# Patient Record
Sex: FEMALE | Race: WHITE | NOT HISPANIC OR LATINO | Employment: OTHER | URBAN - METROPOLITAN AREA
[De-identification: names, ages, dates, MRNs, and addresses within clinical notes are randomized per-mention and may not be internally consistent; named-entity substitution may affect disease eponyms.]

---

## 2017-01-04 ENCOUNTER — GENERIC CONVERSION - ENCOUNTER (OUTPATIENT)
Dept: OTHER | Facility: OTHER | Age: 82
End: 2017-01-04

## 2017-01-10 ENCOUNTER — APPOINTMENT (INPATIENT)
Dept: RADIOLOGY | Facility: HOSPITAL | Age: 82
DRG: 871 | End: 2017-01-10
Payer: MEDICARE

## 2017-01-10 ENCOUNTER — GENERIC CONVERSION - ENCOUNTER (OUTPATIENT)
Dept: OTHER | Facility: OTHER | Age: 82
End: 2017-01-10

## 2017-01-10 ENCOUNTER — APPOINTMENT (INPATIENT)
Dept: NON INVASIVE DIAGNOSTICS | Facility: HOSPITAL | Age: 82
DRG: 871 | End: 2017-01-10
Payer: MEDICARE

## 2017-01-10 ENCOUNTER — HOSPITAL ENCOUNTER (INPATIENT)
Facility: HOSPITAL | Age: 82
LOS: 5 days | Discharge: HOME HEALTH | DRG: 871 | End: 2017-01-15
Attending: EMERGENCY MEDICINE | Admitting: HOSPITALIST
Payer: MEDICARE

## 2017-01-10 ENCOUNTER — APPOINTMENT (EMERGENCY)
Dept: RADIOLOGY | Facility: HOSPITAL | Age: 82
DRG: 871 | End: 2017-01-10
Payer: MEDICARE

## 2017-01-10 DIAGNOSIS — I48.91 ATRIAL FIBRILLATION (HCC): ICD-10-CM

## 2017-01-10 DIAGNOSIS — J18.9 PNEUMONIA OF RIGHT LOWER LOBE DUE TO INFECTIOUS ORGANISM: Primary | ICD-10-CM

## 2017-01-10 PROBLEM — R06.02 SOB (SHORTNESS OF BREATH): Status: ACTIVE | Noted: 2017-01-10

## 2017-01-10 PROBLEM — J96.90 RESPIRATORY FAILURE (HCC): Status: ACTIVE | Noted: 2017-01-10

## 2017-01-10 PROBLEM — R65.20 SEVERE SEPSIS (HCC): Status: ACTIVE | Noted: 2017-01-10

## 2017-01-10 PROBLEM — A41.9 SEVERE SEPSIS (HCC): Status: ACTIVE | Noted: 2017-01-10

## 2017-01-10 LAB
ALBUMIN SERPL BCP-MCNC: 3.1 G/DL (ref 3.5–5)
ALP SERPL-CCNC: 122 U/L (ref 46–116)
ALT SERPL W P-5'-P-CCNC: 14 U/L (ref 12–78)
ANION GAP SERPL CALCULATED.3IONS-SCNC: 9 MMOL/L (ref 4–13)
ARTERIAL PATENCY WRIST A: ABNORMAL
AST SERPL W P-5'-P-CCNC: 13 U/L (ref 5–45)
ATRIAL RATE: 115 BPM
BASE EXCESS BLDA CALC-SCNC: 1 MMOL/L (ref -2–3)
BASOPHILS # BLD AUTO: 0.01 THOUSANDS/ΜL (ref 0–0.1)
BASOPHILS NFR BLD AUTO: 0 % (ref 0–1)
BILIRUB SERPL-MCNC: 0.8 MG/DL (ref 0.2–1)
BILIRUB UR QL STRIP: NEGATIVE
BUN SERPL-MCNC: 20 MG/DL (ref 5–25)
CALCIUM SERPL-MCNC: 9.4 MG/DL (ref 8.3–10.1)
CHLORIDE SERPL-SCNC: 97 MMOL/L (ref 100–108)
CLARITY UR: CLEAR
CO2 SERPL-SCNC: 30 MMOL/L (ref 21–32)
COLOR UR: YELLOW
CREAT SERPL-MCNC: 0.94 MG/DL (ref 0.6–1.3)
DS:DELIVERY SYSTEM: ABNORMAL
EOSINOPHIL # BLD AUTO: 0.01 THOUSAND/ΜL (ref 0–0.61)
EOSINOPHIL NFR BLD AUTO: 0 % (ref 0–6)
ERYTHROCYTE [DISTWIDTH] IN BLOOD BY AUTOMATED COUNT: 13.3 % (ref 11.6–15.1)
FIO2 GAS DIL.REBREATH: 32 L
GFR SERPL CREATININE-BSD FRML MDRD: 56.9 ML/MIN/1.73SQ M
GLUCOSE SERPL-MCNC: 86 MG/DL (ref 65–140)
GLUCOSE UR STRIP-MCNC: NEGATIVE MG/DL
HCO3 BLDA-SCNC: 23.3 MMOL/L (ref 22–28)
HCT VFR BLD AUTO: 45.6 % (ref 34.8–46.1)
HGB BLD-MCNC: 14.7 G/DL (ref 11.5–15.4)
HGB UR QL STRIP.AUTO: NEGATIVE
KETONES UR STRIP-MCNC: NEGATIVE MG/DL
L PNEUMO1 AG UR QL IA.RAPID: NEGATIVE
LACTATE SERPL-SCNC: 1.8 MMOL/L (ref 0.5–2)
LACTATE SERPL-SCNC: 2.6 MMOL/L (ref 0.5–2)
LACTATE SERPL-SCNC: 2.6 MMOL/L (ref 0.5–2)
LEUKOCYTE ESTERASE UR QL STRIP: NEGATIVE
LYMPHOCYTES # BLD AUTO: 1.66 THOUSANDS/ΜL (ref 0.6–4.47)
LYMPHOCYTES NFR BLD AUTO: 9 % (ref 14–44)
MCH RBC QN AUTO: 28.8 PG (ref 26.8–34.3)
MCHC RBC AUTO-ENTMCNC: 32.2 G/DL (ref 31.4–37.4)
MCV RBC AUTO: 89 FL (ref 82–98)
MONOCYTES # BLD AUTO: 1.15 THOUSAND/ΜL (ref 0.17–1.22)
MONOCYTES NFR BLD AUTO: 7 % (ref 4–12)
NEUTROPHILS # BLD AUTO: 14.91 THOUSANDS/ΜL (ref 1.85–7.62)
NEUTS SEG NFR BLD AUTO: 84 % (ref 43–75)
NITRITE UR QL STRIP: NEGATIVE
NT-PROBNP SERPL-MCNC: 3361 PG/ML
PCO2 BLD: 24 MMOL/L (ref 21–32)
PCO2 BLD: 30.2 MM HG (ref 36–44)
PH BLD: 7.5 [PH] (ref 7.35–7.45)
PH UR STRIP.AUTO: 6 [PH] (ref 4.5–8)
PLATELET # BLD AUTO: 381 THOUSANDS/UL (ref 149–390)
PMV BLD AUTO: 10 FL (ref 8.9–12.7)
PO2 BLD: 48 MM HG (ref 75–129)
POTASSIUM SERPL-SCNC: 3 MMOL/L (ref 3.5–5.3)
PROT SERPL-MCNC: 8.3 G/DL (ref 6.4–8.2)
PROT UR STRIP-MCNC: NEGATIVE MG/DL
QRS AXIS: -62 DEGREES
QRSD INTERVAL: 82 MS
QT INTERVAL: 322 MS
QTC INTERVAL: 447 MS
RBC # BLD AUTO: 5.11 MILLION/UL (ref 3.81–5.12)
S PNEUM AG UR QL: NEGATIVE
SAMPLE SITE: ABNORMAL
SAO2 % BLD FROM PO2: 87 % (ref 95–98)
SODIUM SERPL-SCNC: 136 MMOL/L (ref 136–145)
SP GR UR STRIP.AUTO: 1.01 (ref 1–1.03)
SPECIMEN SOURCE: ABNORMAL
T WAVE AXIS: 91 DEGREES
T4 FREE SERPL-MCNC: 2.31 NG/DL (ref 0.76–1.46)
TROPONIN I SERPL-MCNC: 0.02 NG/ML
TROPONIN I SERPL-MCNC: <0.02 NG/ML
TSH SERPL DL<=0.05 MIU/L-ACNC: 0.04 UIU/ML (ref 0.36–3.74)
UROBILINOGEN UR QL STRIP.AUTO: 0.2 E.U./DL
VENTRICULAR RATE: 116 BPM
WBC # BLD AUTO: 17.74 THOUSAND/UL (ref 4.31–10.16)

## 2017-01-10 PROCEDURE — 93306 TTE W/DOPPLER COMPLETE: CPT

## 2017-01-10 PROCEDURE — 94640 AIRWAY INHALATION TREATMENT: CPT

## 2017-01-10 PROCEDURE — 87070 CULTURE OTHR SPECIMN AEROBIC: CPT | Performed by: HOSPITALIST

## 2017-01-10 PROCEDURE — 80053 COMPREHEN METABOLIC PANEL: CPT | Performed by: EMERGENCY MEDICINE

## 2017-01-10 PROCEDURE — 81003 URINALYSIS AUTO W/O SCOPE: CPT | Performed by: HOSPITALIST

## 2017-01-10 PROCEDURE — 96374 THER/PROPH/DIAG INJ IV PUSH: CPT

## 2017-01-10 PROCEDURE — 36600 WITHDRAWAL OF ARTERIAL BLOOD: CPT

## 2017-01-10 PROCEDURE — 94760 N-INVAS EAR/PLS OXIMETRY 1: CPT

## 2017-01-10 PROCEDURE — 84443 ASSAY THYROID STIM HORMONE: CPT | Performed by: HOSPITALIST

## 2017-01-10 PROCEDURE — 71010 HB CHEST X-RAY 1 VIEW FRONTAL (PORTABLE): CPT

## 2017-01-10 PROCEDURE — 83880 ASSAY OF NATRIURETIC PEPTIDE: CPT | Performed by: HOSPITALIST

## 2017-01-10 PROCEDURE — 87040 BLOOD CULTURE FOR BACTERIA: CPT | Performed by: EMERGENCY MEDICINE

## 2017-01-10 PROCEDURE — 87798 DETECT AGENT NOS DNA AMP: CPT | Performed by: HOSPITALIST

## 2017-01-10 PROCEDURE — 71020 HB CHEST X-RAY 2VW FRONTAL&LATL: CPT

## 2017-01-10 PROCEDURE — 93005 ELECTROCARDIOGRAM TRACING: CPT | Performed by: EMERGENCY MEDICINE

## 2017-01-10 PROCEDURE — 82803 BLOOD GASES ANY COMBINATION: CPT

## 2017-01-10 PROCEDURE — 36415 COLL VENOUS BLD VENIPUNCTURE: CPT | Performed by: EMERGENCY MEDICINE

## 2017-01-10 PROCEDURE — 87449 NOS EACH ORGANISM AG IA: CPT | Performed by: HOSPITALIST

## 2017-01-10 PROCEDURE — 84439 ASSAY OF FREE THYROXINE: CPT | Performed by: HOSPITALIST

## 2017-01-10 PROCEDURE — 85025 COMPLETE CBC W/AUTO DIFF WBC: CPT | Performed by: EMERGENCY MEDICINE

## 2017-01-10 PROCEDURE — 99285 EMERGENCY DEPT VISIT HI MDM: CPT

## 2017-01-10 PROCEDURE — 84484 ASSAY OF TROPONIN QUANT: CPT | Performed by: HOSPITALIST

## 2017-01-10 PROCEDURE — 87205 SMEAR GRAM STAIN: CPT | Performed by: HOSPITALIST

## 2017-01-10 PROCEDURE — 83605 ASSAY OF LACTIC ACID: CPT | Performed by: EMERGENCY MEDICINE

## 2017-01-10 PROCEDURE — 83605 ASSAY OF LACTIC ACID: CPT | Performed by: HOSPITALIST

## 2017-01-10 RX ORDER — METOPROLOL SUCCINATE 25 MG/1
25 TABLET, EXTENDED RELEASE ORAL DAILY
Status: DISCONTINUED | OUTPATIENT
Start: 2017-01-10 | End: 2017-01-12

## 2017-01-10 RX ORDER — SODIUM CHLORIDE 9 MG/ML
125 INJECTION, SOLUTION INTRAVENOUS CONTINUOUS
Status: DISCONTINUED | OUTPATIENT
Start: 2017-01-10 | End: 2017-01-10

## 2017-01-10 RX ORDER — FENTANYL 50 UG/H
1 PATCH TRANSDERMAL
COMMUNITY

## 2017-01-10 RX ORDER — TRAMADOL HYDROCHLORIDE 50 MG/1
50 TABLET ORAL ONCE
Status: COMPLETED | OUTPATIENT
Start: 2017-01-10 | End: 2017-01-10

## 2017-01-10 RX ORDER — TOLTERODINE TARTRATE 2 MG/1
2 TABLET, EXTENDED RELEASE ORAL 2 TIMES DAILY
Status: DISCONTINUED | OUTPATIENT
Start: 2017-01-10 | End: 2017-01-10

## 2017-01-10 RX ORDER — FLUTICASONE PROPIONATE 50 MCG
2 SPRAY, SUSPENSION (ML) NASAL DAILY
Status: DISCONTINUED | OUTPATIENT
Start: 2017-01-10 | End: 2017-01-15 | Stop reason: HOSPADM

## 2017-01-10 RX ORDER — LEVALBUTEROL INHALATION SOLUTION 0.63 MG/3ML
0.63 SOLUTION RESPIRATORY (INHALATION)
Status: DISCONTINUED | OUTPATIENT
Start: 2017-01-10 | End: 2017-01-15 | Stop reason: HOSPADM

## 2017-01-10 RX ORDER — ALBUTEROL SULFATE 2.5 MG/3ML
2.5 SOLUTION RESPIRATORY (INHALATION) ONCE
Status: COMPLETED | OUTPATIENT
Start: 2017-01-10 | End: 2017-01-10

## 2017-01-10 RX ORDER — POTASSIUM CHLORIDE 20 MEQ/1
20 TABLET, EXTENDED RELEASE ORAL ONCE
Status: COMPLETED | OUTPATIENT
Start: 2017-01-10 | End: 2017-01-10

## 2017-01-10 RX ORDER — MONTELUKAST SODIUM 10 MG/1
10 TABLET ORAL
Status: DISCONTINUED | OUTPATIENT
Start: 2017-01-10 | End: 2017-01-15 | Stop reason: HOSPADM

## 2017-01-10 RX ORDER — FLUTICASONE PROPIONATE 50 MCG
2 SPRAY, SUSPENSION (ML) NASAL DAILY
COMMUNITY

## 2017-01-10 RX ORDER — LEVOFLOXACIN 5 MG/ML
750 INJECTION, SOLUTION INTRAVENOUS EVERY 24 HOURS
Status: DISCONTINUED | OUTPATIENT
Start: 2017-01-10 | End: 2017-01-10

## 2017-01-10 RX ORDER — LISINOPRIL 40 MG/1
40 TABLET ORAL DAILY
COMMUNITY
End: 2017-01-15 | Stop reason: HOSPADM

## 2017-01-10 RX ORDER — TOLTERODINE TARTRATE 2 MG/1
2 TABLET, EXTENDED RELEASE ORAL 2 TIMES DAILY
COMMUNITY

## 2017-01-10 RX ORDER — METOPROLOL SUCCINATE 25 MG/1
25 TABLET, EXTENDED RELEASE ORAL DAILY
COMMUNITY
End: 2017-01-15 | Stop reason: HOSPADM

## 2017-01-10 RX ORDER — LANOLIN ALCOHOL/MO/W.PET/CERES
3 CREAM (GRAM) TOPICAL
Status: DISCONTINUED | OUTPATIENT
Start: 2017-01-10 | End: 2017-01-15 | Stop reason: HOSPADM

## 2017-01-10 RX ORDER — FENTANYL 50 UG/H
50 PATCH TRANSDERMAL
Status: DISCONTINUED | OUTPATIENT
Start: 2017-01-11 | End: 2017-01-10

## 2017-01-10 RX ORDER — POTASSIUM CHLORIDE 20 MEQ/1
40 TABLET, EXTENDED RELEASE ORAL ONCE
Status: COMPLETED | OUTPATIENT
Start: 2017-01-10 | End: 2017-01-10

## 2017-01-10 RX ORDER — AMLODIPINE BESYLATE 10 MG/1
10 TABLET ORAL DAILY
COMMUNITY
End: 2017-01-15 | Stop reason: HOSPADM

## 2017-01-10 RX ORDER — FUROSEMIDE 10 MG/ML
20 INJECTION INTRAMUSCULAR; INTRAVENOUS ONCE
Status: COMPLETED | OUTPATIENT
Start: 2017-01-10 | End: 2017-01-10

## 2017-01-10 RX ORDER — GUAIFENESIN/DEXTROMETHORPHAN 100-10MG/5
10 SYRUP ORAL EVERY 4 HOURS PRN
Status: DISCONTINUED | OUTPATIENT
Start: 2017-01-10 | End: 2017-01-15 | Stop reason: HOSPADM

## 2017-01-10 RX ORDER — ACETAMINOPHEN 325 MG/1
650 TABLET ORAL EVERY 6 HOURS PRN
Status: DISCONTINUED | OUTPATIENT
Start: 2017-01-10 | End: 2017-01-15 | Stop reason: HOSPADM

## 2017-01-10 RX ORDER — TOLTERODINE TARTRATE 1 MG/1
2 TABLET, EXTENDED RELEASE ORAL 2 TIMES DAILY
Status: DISCONTINUED | OUTPATIENT
Start: 2017-01-10 | End: 2017-01-15 | Stop reason: HOSPADM

## 2017-01-10 RX ORDER — DILTIAZEM HYDROCHLORIDE 5 MG/ML
5 INJECTION INTRAVENOUS ONCE
Status: COMPLETED | OUTPATIENT
Start: 2017-01-10 | End: 2017-01-10

## 2017-01-10 RX ORDER — MONTELUKAST SODIUM 10 MG/1
10 TABLET ORAL
COMMUNITY

## 2017-01-10 RX ORDER — METOPROLOL SUCCINATE 25 MG/1
25 TABLET, EXTENDED RELEASE ORAL DAILY
Status: DISCONTINUED | OUTPATIENT
Start: 2017-01-10 | End: 2017-01-10

## 2017-01-10 RX ORDER — HEPARIN SODIUM 5000 [USP'U]/ML
5000 INJECTION, SOLUTION INTRAVENOUS; SUBCUTANEOUS EVERY 8 HOURS SCHEDULED
Status: DISCONTINUED | OUTPATIENT
Start: 2017-01-10 | End: 2017-01-10

## 2017-01-10 RX ORDER — FENTANYL 50 UG/H
50 PATCH TRANSDERMAL
Status: DISCONTINUED | OUTPATIENT
Start: 2017-01-11 | End: 2017-01-11

## 2017-01-10 RX ORDER — HYDROCHLOROTHIAZIDE 25 MG/1
25 TABLET ORAL DAILY
COMMUNITY
End: 2017-01-15 | Stop reason: HOSPADM

## 2017-01-10 RX ORDER — HYDROCODONE BITARTRATE AND ACETAMINOPHEN 5; 325 MG/1; MG/1
1 TABLET ORAL ONCE
Status: DISCONTINUED | OUTPATIENT
Start: 2017-01-10 | End: 2017-01-10

## 2017-01-10 RX ORDER — LEVOFLOXACIN 5 MG/ML
750 INJECTION, SOLUTION INTRAVENOUS ONCE
Status: COMPLETED | OUTPATIENT
Start: 2017-01-10 | End: 2017-01-10

## 2017-01-10 RX ADMIN — METOPROLOL TARTRATE 12.5 MG: 25 TABLET ORAL at 13:35

## 2017-01-10 RX ADMIN — METOPROLOL SUCCINATE 25 MG: 25 TABLET, EXTENDED RELEASE ORAL at 17:29

## 2017-01-10 RX ADMIN — DILTIAZEM HYDROCHLORIDE 5 MG: 5 INJECTION INTRAVENOUS at 09:35

## 2017-01-10 RX ADMIN — APIXABAN 2.5 MG: 2.5 TABLET, FILM COATED ORAL at 17:29

## 2017-01-10 RX ADMIN — MELATONIN TAB 3 MG 3 MG: 3 TAB at 23:45

## 2017-01-10 RX ADMIN — IPRATROPIUM BROMIDE 0.5 MG: 0.5 SOLUTION RESPIRATORY (INHALATION) at 08:22

## 2017-01-10 RX ADMIN — SODIUM CHLORIDE 125 ML/HR: 0.9 INJECTION, SOLUTION INTRAVENOUS at 08:59

## 2017-01-10 RX ADMIN — TRAMADOL HYDROCHLORIDE 50 MG: 50 TABLET, COATED ORAL at 21:51

## 2017-01-10 RX ADMIN — DILTIAZEM HYDROCHLORIDE 5 MG/HR: 5 INJECTION INTRAVENOUS at 09:59

## 2017-01-10 RX ADMIN — FLUTICASONE PROPIONATE 2 SPRAY: 50 SPRAY, METERED NASAL at 13:36

## 2017-01-10 RX ADMIN — POTASSIUM CHLORIDE 20 MEQ: 1500 TABLET, EXTENDED RELEASE ORAL at 09:30

## 2017-01-10 RX ADMIN — ALBUTEROL SULFATE 2.5 MG: 2.5 SOLUTION RESPIRATORY (INHALATION) at 08:22

## 2017-01-10 RX ADMIN — AZITHROMYCIN MONOHYDRATE 500 MG: 500 INJECTION, POWDER, LYOPHILIZED, FOR SOLUTION INTRAVENOUS at 13:35

## 2017-01-10 RX ADMIN — SODIUM CHLORIDE 1251 ML: 0.9 INJECTION, SOLUTION INTRAVENOUS at 12:50

## 2017-01-10 RX ADMIN — IPRATROPIUM BROMIDE 0.5 MG: 0.5 SOLUTION RESPIRATORY (INHALATION) at 13:56

## 2017-01-10 RX ADMIN — LEVALBUTEROL HYDROCHLORIDE 0.63 MG: 0.63 SOLUTION RESPIRATORY (INHALATION) at 13:55

## 2017-01-10 RX ADMIN — MONTELUKAST SODIUM 10 MG: 10 TABLET, FILM COATED ORAL at 21:51

## 2017-01-10 RX ADMIN — ACETAMINOPHEN 650 MG: 325 TABLET, FILM COATED ORAL at 17:40

## 2017-01-10 RX ADMIN — HEPARIN SODIUM 5000 UNITS: 5000 INJECTION, SOLUTION INTRAVENOUS; SUBCUTANEOUS at 13:34

## 2017-01-10 RX ADMIN — LEVOFLOXACIN 750 MG: 5 INJECTION, SOLUTION INTRAVENOUS at 09:40

## 2017-01-10 RX ADMIN — SODIUM CHLORIDE 125 ML/HR: 0.9 INJECTION, SOLUTION INTRAVENOUS at 13:51

## 2017-01-10 RX ADMIN — TOLTERODINE TARTRATE 2 MG: 1 TABLET, FILM COATED ORAL at 13:36

## 2017-01-10 RX ADMIN — DILTIAZEM HYDROCHLORIDE 5 MG: 5 INJECTION INTRAVENOUS at 08:59

## 2017-01-10 RX ADMIN — CEFTRIAXONE SODIUM 1000 MG: 1 INJECTION, POWDER, FOR SOLUTION INTRAMUSCULAR; INTRAVENOUS at 15:53

## 2017-01-10 RX ADMIN — POTASSIUM CHLORIDE 40 MEQ: 1500 TABLET, EXTENDED RELEASE ORAL at 13:34

## 2017-01-10 RX ADMIN — FUROSEMIDE 20 MG: 10 INJECTION, SOLUTION INTRAMUSCULAR; INTRAVENOUS at 19:56

## 2017-01-11 LAB
ANION GAP SERPL CALCULATED.3IONS-SCNC: 8 MMOL/L (ref 4–13)
BUN SERPL-MCNC: 11 MG/DL (ref 5–25)
CALCIUM SERPL-MCNC: 7.7 MG/DL (ref 8.3–10.1)
CHLORIDE SERPL-SCNC: 101 MMOL/L (ref 100–108)
CO2 SERPL-SCNC: 27 MMOL/L (ref 21–32)
CREAT SERPL-MCNC: 0.76 MG/DL (ref 0.6–1.3)
ERYTHROCYTE [DISTWIDTH] IN BLOOD BY AUTOMATED COUNT: 13.2 % (ref 11.6–15.1)
FLUAV AG SPEC QL: DETECTED
FLUBV AG SPEC QL: ABNORMAL
GFR SERPL CREATININE-BSD FRML MDRD: >60 ML/MIN/1.73SQ M
GLUCOSE SERPL-MCNC: 105 MG/DL (ref 65–140)
GLUCOSE SERPL-MCNC: 92 MG/DL (ref 65–140)
HCT VFR BLD AUTO: 38.8 % (ref 34.8–46.1)
HGB BLD-MCNC: 12.7 G/DL (ref 11.5–15.4)
INR PPP: 1.31 (ref 0.86–1.16)
MAGNESIUM SERPL-MCNC: 1.5 MG/DL (ref 1.6–2.6)
MCH RBC QN AUTO: 29.6 PG (ref 26.8–34.3)
MCHC RBC AUTO-ENTMCNC: 32.7 G/DL (ref 31.4–37.4)
MCV RBC AUTO: 90 FL (ref 82–98)
PHOSPHATE SERPL-MCNC: 1.6 MG/DL (ref 2.3–4.1)
PLATELET # BLD AUTO: 264 THOUSANDS/UL (ref 149–390)
PMV BLD AUTO: 10 FL (ref 8.9–12.7)
POTASSIUM SERPL-SCNC: 3.4 MMOL/L (ref 3.5–5.3)
PROTHROMBIN TIME: 16.1 SECONDS (ref 12–14.3)
RBC # BLD AUTO: 4.29 MILLION/UL (ref 3.81–5.12)
RSV B RNA SPEC QL NAA+PROBE: ABNORMAL
SODIUM SERPL-SCNC: 136 MMOL/L (ref 136–145)
WBC # BLD AUTO: 14.01 THOUSAND/UL (ref 4.31–10.16)

## 2017-01-11 PROCEDURE — 84100 ASSAY OF PHOSPHORUS: CPT | Performed by: HOSPITALIST

## 2017-01-11 PROCEDURE — 94640 AIRWAY INHALATION TREATMENT: CPT

## 2017-01-11 PROCEDURE — 85610 PROTHROMBIN TIME: CPT | Performed by: HOSPITALIST

## 2017-01-11 PROCEDURE — 82948 REAGENT STRIP/BLOOD GLUCOSE: CPT

## 2017-01-11 PROCEDURE — 85027 COMPLETE CBC AUTOMATED: CPT | Performed by: HOSPITALIST

## 2017-01-11 PROCEDURE — 80048 BASIC METABOLIC PNL TOTAL CA: CPT | Performed by: HOSPITALIST

## 2017-01-11 PROCEDURE — 94760 N-INVAS EAR/PLS OXIMETRY 1: CPT

## 2017-01-11 PROCEDURE — 94664 DEMO&/EVAL PT USE INHALER: CPT

## 2017-01-11 PROCEDURE — 83735 ASSAY OF MAGNESIUM: CPT | Performed by: HOSPITALIST

## 2017-01-11 RX ORDER — FUROSEMIDE 10 MG/ML
20 INJECTION INTRAMUSCULAR; INTRAVENOUS ONCE
Status: COMPLETED | OUTPATIENT
Start: 2017-01-11 | End: 2017-01-11

## 2017-01-11 RX ORDER — POTASSIUM CHLORIDE 20 MEQ/1
40 TABLET, EXTENDED RELEASE ORAL ONCE
Status: COMPLETED | OUTPATIENT
Start: 2017-01-11 | End: 2017-01-11

## 2017-01-11 RX ORDER — OSELTAMIVIR PHOSPHATE 75 MG/1
75 CAPSULE ORAL EVERY 12 HOURS SCHEDULED
Status: DISCONTINUED | OUTPATIENT
Start: 2017-01-11 | End: 2017-01-15 | Stop reason: HOSPADM

## 2017-01-11 RX ORDER — FENTANYL 50 UG/H
50 PATCH TRANSDERMAL
Status: DISCONTINUED | OUTPATIENT
Start: 2017-01-12 | End: 2017-01-15 | Stop reason: HOSPADM

## 2017-01-11 RX ORDER — TRAMADOL HYDROCHLORIDE 50 MG/1
50 TABLET ORAL EVERY 6 HOURS PRN
Status: DISCONTINUED | OUTPATIENT
Start: 2017-01-11 | End: 2017-01-15 | Stop reason: HOSPADM

## 2017-01-11 RX ORDER — DIGOXIN 0.25 MG/ML
250 INJECTION INTRAMUSCULAR; INTRAVENOUS ONCE
Status: COMPLETED | OUTPATIENT
Start: 2017-01-11 | End: 2017-01-11

## 2017-01-11 RX ADMIN — METOPROLOL TARTRATE 5 MG: 5 INJECTION INTRAVENOUS at 09:27

## 2017-01-11 RX ADMIN — DIGOXIN 250 MCG: 0.25 INJECTION INTRAMUSCULAR; INTRAVENOUS at 09:27

## 2017-01-11 RX ADMIN — LEVALBUTEROL HYDROCHLORIDE 0.63 MG: 0.63 SOLUTION RESPIRATORY (INHALATION) at 00:43

## 2017-01-11 RX ADMIN — TOLTERODINE TARTRATE 2 MG: 1 TABLET, FILM COATED ORAL at 18:07

## 2017-01-11 RX ADMIN — POTASSIUM CHLORIDE 40 MEQ: 1500 TABLET, EXTENDED RELEASE ORAL at 12:13

## 2017-01-11 RX ADMIN — MELATONIN TAB 3 MG 3 MG: 3 TAB at 21:38

## 2017-01-11 RX ADMIN — FLUTICASONE PROPIONATE 2 SPRAY: 50 SPRAY, METERED NASAL at 08:44

## 2017-01-11 RX ADMIN — ACETAMINOPHEN 650 MG: 325 TABLET, FILM COATED ORAL at 14:23

## 2017-01-11 RX ADMIN — TRAMADOL HYDROCHLORIDE 50 MG: 50 TABLET, COATED ORAL at 16:32

## 2017-01-11 RX ADMIN — FUROSEMIDE 20 MG: 10 INJECTION, SOLUTION INTRAMUSCULAR; INTRAVENOUS at 13:02

## 2017-01-11 RX ADMIN — TOLTERODINE TARTRATE 2 MG: 1 TABLET, FILM COATED ORAL at 08:44

## 2017-01-11 RX ADMIN — LEVALBUTEROL HYDROCHLORIDE 0.63 MG: 0.63 SOLUTION RESPIRATORY (INHALATION) at 16:13

## 2017-01-11 RX ADMIN — ACETAMINOPHEN 650 MG: 325 TABLET, FILM COATED ORAL at 08:44

## 2017-01-11 RX ADMIN — IPRATROPIUM BROMIDE 0.5 MG: 0.5 SOLUTION RESPIRATORY (INHALATION) at 16:13

## 2017-01-11 RX ADMIN — TRAMADOL HYDROCHLORIDE 50 MG: 50 TABLET, COATED ORAL at 09:27

## 2017-01-11 RX ADMIN — CEFTRIAXONE SODIUM 1000 MG: 1 INJECTION, POWDER, FOR SOLUTION INTRAMUSCULAR; INTRAVENOUS at 14:56

## 2017-01-11 RX ADMIN — OSELTAMIVIR PHOSPHATE 75 MG: 75 CAPSULE ORAL at 21:38

## 2017-01-11 RX ADMIN — MONTELUKAST SODIUM 10 MG: 10 TABLET, FILM COATED ORAL at 21:38

## 2017-01-11 RX ADMIN — AZITHROMYCIN MONOHYDRATE 500 MG: 500 INJECTION, POWDER, LYOPHILIZED, FOR SOLUTION INTRAVENOUS at 13:02

## 2017-01-11 RX ADMIN — METOPROLOL SUCCINATE 25 MG: 25 TABLET, EXTENDED RELEASE ORAL at 08:44

## 2017-01-11 RX ADMIN — APIXABAN 2.5 MG: 2.5 TABLET, FILM COATED ORAL at 08:44

## 2017-01-11 RX ADMIN — APIXABAN 2.5 MG: 2.5 TABLET, FILM COATED ORAL at 18:07

## 2017-01-11 RX ADMIN — LEVALBUTEROL HYDROCHLORIDE 0.63 MG: 0.63 SOLUTION RESPIRATORY (INHALATION) at 08:14

## 2017-01-11 RX ADMIN — OSELTAMIVIR PHOSPHATE 75 MG: 75 CAPSULE ORAL at 12:15

## 2017-01-12 ENCOUNTER — APPOINTMENT (INPATIENT)
Dept: RADIOLOGY | Facility: HOSPITAL | Age: 82
DRG: 871 | End: 2017-01-12
Payer: MEDICARE

## 2017-01-12 LAB
ANION GAP SERPL CALCULATED.3IONS-SCNC: 7 MMOL/L (ref 4–13)
BACTERIA SPT RESP CULT: NORMAL
BACTERIA SPT RESP CULT: NORMAL
BUN SERPL-MCNC: 12 MG/DL (ref 5–25)
CALCIUM SERPL-MCNC: 7.2 MG/DL (ref 8.3–10.1)
CHLORIDE SERPL-SCNC: 100 MMOL/L (ref 100–108)
CO2 SERPL-SCNC: 26 MMOL/L (ref 21–32)
CREAT SERPL-MCNC: 0.71 MG/DL (ref 0.6–1.3)
ERYTHROCYTE [DISTWIDTH] IN BLOOD BY AUTOMATED COUNT: 13.1 % (ref 11.6–15.1)
GFR SERPL CREATININE-BSD FRML MDRD: >60 ML/MIN/1.73SQ M
GLUCOSE SERPL-MCNC: 96 MG/DL (ref 65–140)
GRAM STN SPEC: NORMAL
HCT VFR BLD AUTO: 38.3 % (ref 34.8–46.1)
HGB BLD-MCNC: 12.3 G/DL (ref 11.5–15.4)
MCH RBC QN AUTO: 29.2 PG (ref 26.8–34.3)
MCHC RBC AUTO-ENTMCNC: 32.1 G/DL (ref 31.4–37.4)
MCV RBC AUTO: 91 FL (ref 82–98)
PLATELET # BLD AUTO: 262 THOUSANDS/UL (ref 149–390)
PMV BLD AUTO: 9.7 FL (ref 8.9–12.7)
POTASSIUM SERPL-SCNC: 4 MMOL/L (ref 3.5–5.3)
RBC # BLD AUTO: 4.21 MILLION/UL (ref 3.81–5.12)
SODIUM SERPL-SCNC: 133 MMOL/L (ref 136–145)
WBC # BLD AUTO: 12.61 THOUSAND/UL (ref 4.31–10.16)

## 2017-01-12 PROCEDURE — 94640 AIRWAY INHALATION TREATMENT: CPT

## 2017-01-12 PROCEDURE — 71010 HB CHEST X-RAY 1 VIEW FRONTAL (PORTABLE): CPT

## 2017-01-12 PROCEDURE — 85027 COMPLETE CBC AUTOMATED: CPT | Performed by: HOSPITALIST

## 2017-01-12 PROCEDURE — 94760 N-INVAS EAR/PLS OXIMETRY 1: CPT

## 2017-01-12 PROCEDURE — 80048 BASIC METABOLIC PNL TOTAL CA: CPT | Performed by: HOSPITALIST

## 2017-01-12 RX ORDER — LORAZEPAM 2 MG/ML
0.5 INJECTION INTRAMUSCULAR ONCE
Status: COMPLETED | OUTPATIENT
Start: 2017-01-12 | End: 2017-01-12

## 2017-01-12 RX ORDER — FUROSEMIDE 10 MG/ML
20 INJECTION INTRAMUSCULAR; INTRAVENOUS ONCE
Status: COMPLETED | OUTPATIENT
Start: 2017-01-12 | End: 2017-01-12

## 2017-01-12 RX ORDER — METHYLPREDNISOLONE SODIUM SUCCINATE 40 MG/ML
40 INJECTION, POWDER, LYOPHILIZED, FOR SOLUTION INTRAMUSCULAR; INTRAVENOUS DAILY
Status: DISCONTINUED | OUTPATIENT
Start: 2017-01-12 | End: 2017-01-14

## 2017-01-12 RX ORDER — ONDANSETRON 2 MG/ML
4 INJECTION INTRAMUSCULAR; INTRAVENOUS EVERY 6 HOURS PRN
Status: DISCONTINUED | OUTPATIENT
Start: 2017-01-12 | End: 2017-01-15 | Stop reason: HOSPADM

## 2017-01-12 RX ADMIN — FENTANYL 50 MCG: 50 PATCH, EXTENDED RELEASE TRANSDERMAL at 09:03

## 2017-01-12 RX ADMIN — MELATONIN TAB 3 MG 3 MG: 3 TAB at 21:50

## 2017-01-12 RX ADMIN — METOPROLOL TARTRATE 25 MG: 25 TABLET ORAL at 09:02

## 2017-01-12 RX ADMIN — ONDANSETRON 4 MG: 2 INJECTION INTRAMUSCULAR; INTRAVENOUS at 00:19

## 2017-01-12 RX ADMIN — METOPROLOL TARTRATE 25 MG: 25 TABLET ORAL at 21:50

## 2017-01-12 RX ADMIN — GUAIFENESIN AND DEXTROMETHORPHAN 10 ML: 100; 10 SYRUP ORAL at 10:12

## 2017-01-12 RX ADMIN — LEVALBUTEROL HYDROCHLORIDE 0.63 MG: 0.63 SOLUTION RESPIRATORY (INHALATION) at 15:06

## 2017-01-12 RX ADMIN — METHYLPREDNISOLONE SODIUM SUCCINATE 40 MG: 40 INJECTION, POWDER, FOR SOLUTION INTRAMUSCULAR; INTRAVENOUS at 09:03

## 2017-01-12 RX ADMIN — OSELTAMIVIR PHOSPHATE 75 MG: 75 CAPSULE ORAL at 09:07

## 2017-01-12 RX ADMIN — MONTELUKAST SODIUM 10 MG: 10 TABLET, FILM COATED ORAL at 21:50

## 2017-01-12 RX ADMIN — METOPROLOL TARTRATE 25 MG: 25 TABLET ORAL at 15:30

## 2017-01-12 RX ADMIN — APIXABAN 2.5 MG: 2.5 TABLET, FILM COATED ORAL at 09:03

## 2017-01-12 RX ADMIN — TOLTERODINE TARTRATE 2 MG: 1 TABLET, FILM COATED ORAL at 09:07

## 2017-01-12 RX ADMIN — CEFTRIAXONE SODIUM 1000 MG: 1 INJECTION, POWDER, FOR SOLUTION INTRAMUSCULAR; INTRAVENOUS at 15:27

## 2017-01-12 RX ADMIN — LEVALBUTEROL HYDROCHLORIDE 0.63 MG: 0.63 SOLUTION RESPIRATORY (INHALATION) at 07:33

## 2017-01-12 RX ADMIN — IPRATROPIUM BROMIDE 0.5 MG: 0.5 SOLUTION RESPIRATORY (INHALATION) at 15:06

## 2017-01-12 RX ADMIN — LORAZEPAM 0.5 MG: 2 INJECTION, SOLUTION INTRAMUSCULAR; INTRAVENOUS at 00:49

## 2017-01-12 RX ADMIN — FUROSEMIDE 20 MG: 10 INJECTION, SOLUTION INTRAMUSCULAR; INTRAVENOUS at 10:12

## 2017-01-12 RX ADMIN — OSELTAMIVIR PHOSPHATE 75 MG: 75 CAPSULE ORAL at 21:52

## 2017-01-12 RX ADMIN — IPRATROPIUM BROMIDE 0.5 MG: 0.5 SOLUTION RESPIRATORY (INHALATION) at 07:33

## 2017-01-12 RX ADMIN — TOLTERODINE TARTRATE 2 MG: 1 TABLET, FILM COATED ORAL at 17:40

## 2017-01-12 RX ADMIN — AZITHROMYCIN MONOHYDRATE 500 MG: 500 INJECTION, POWDER, LYOPHILIZED, FOR SOLUTION INTRAVENOUS at 13:02

## 2017-01-12 RX ADMIN — FLUTICASONE PROPIONATE 2 SPRAY: 50 SPRAY, METERED NASAL at 09:07

## 2017-01-12 RX ADMIN — APIXABAN 2.5 MG: 2.5 TABLET, FILM COATED ORAL at 17:39

## 2017-01-13 LAB
ANION GAP SERPL CALCULATED.3IONS-SCNC: 8 MMOL/L (ref 4–13)
BASOPHILS # BLD MANUAL: 0 THOUSAND/UL (ref 0–0.1)
BASOPHILS NFR MAR MANUAL: 0 % (ref 0–1)
BUN SERPL-MCNC: 19 MG/DL (ref 5–25)
CALCIUM SERPL-MCNC: 7.8 MG/DL (ref 8.3–10.1)
CHLORIDE SERPL-SCNC: 96 MMOL/L (ref 100–108)
CO2 SERPL-SCNC: 28 MMOL/L (ref 21–32)
CREAT SERPL-MCNC: 0.61 MG/DL (ref 0.6–1.3)
EOSINOPHIL # BLD MANUAL: 0 THOUSAND/UL (ref 0–0.4)
EOSINOPHIL NFR BLD MANUAL: 0 % (ref 0–6)
ERYTHROCYTE [DISTWIDTH] IN BLOOD BY AUTOMATED COUNT: 12.8 % (ref 11.6–15.1)
GFR SERPL CREATININE-BSD FRML MDRD: >60 ML/MIN/1.73SQ M
GLUCOSE SERPL-MCNC: 117 MG/DL (ref 65–140)
HCT VFR BLD AUTO: 39.5 % (ref 34.8–46.1)
HGB BLD-MCNC: 12.9 G/DL (ref 11.5–15.4)
LYMPHOCYTES # BLD AUTO: 0.64 THOUSAND/UL (ref 0.6–4.47)
LYMPHOCYTES # BLD AUTO: 6 % (ref 14–44)
MCH RBC QN AUTO: 29.4 PG (ref 26.8–34.3)
MCHC RBC AUTO-ENTMCNC: 32.7 G/DL (ref 31.4–37.4)
MCV RBC AUTO: 90 FL (ref 82–98)
MONOCYTES # BLD AUTO: 0.21 THOUSAND/UL (ref 0–1.22)
MONOCYTES NFR BLD: 2 % (ref 4–12)
NEUTROPHILS # BLD MANUAL: 9.74 THOUSAND/UL (ref 1.85–7.62)
NEUTS SEG NFR BLD AUTO: 92 % (ref 43–75)
PLATELET # BLD AUTO: 313 THOUSANDS/UL (ref 149–390)
PLATELET BLD QL SMEAR: ADEQUATE
PMV BLD AUTO: 9.6 FL (ref 8.9–12.7)
POTASSIUM SERPL-SCNC: 4.2 MMOL/L (ref 3.5–5.3)
RBC # BLD AUTO: 4.39 MILLION/UL (ref 3.81–5.12)
SODIUM SERPL-SCNC: 132 MMOL/L (ref 136–145)
TOTAL CELLS COUNTED SPEC: 100
WBC # BLD AUTO: 10.59 THOUSAND/UL (ref 4.31–10.16)

## 2017-01-13 PROCEDURE — 85007 BL SMEAR W/DIFF WBC COUNT: CPT | Performed by: HOSPITALIST

## 2017-01-13 PROCEDURE — 80048 BASIC METABOLIC PNL TOTAL CA: CPT | Performed by: HOSPITALIST

## 2017-01-13 PROCEDURE — 85027 COMPLETE CBC AUTOMATED: CPT | Performed by: HOSPITALIST

## 2017-01-13 PROCEDURE — G8979 MOBILITY GOAL STATUS: HCPCS

## 2017-01-13 PROCEDURE — 97163 PT EVAL HIGH COMPLEX 45 MIN: CPT

## 2017-01-13 PROCEDURE — 94640 AIRWAY INHALATION TREATMENT: CPT

## 2017-01-13 PROCEDURE — G8978 MOBILITY CURRENT STATUS: HCPCS

## 2017-01-13 PROCEDURE — 94760 N-INVAS EAR/PLS OXIMETRY 1: CPT

## 2017-01-13 RX ORDER — CALCIUM CARBONATE 200(500)MG
500 TABLET,CHEWABLE ORAL 3 TIMES DAILY PRN
Status: DISCONTINUED | OUTPATIENT
Start: 2017-01-13 | End: 2017-01-15 | Stop reason: HOSPADM

## 2017-01-13 RX ORDER — DOCUSATE SODIUM 100 MG/1
100 CAPSULE, LIQUID FILLED ORAL 2 TIMES DAILY
Status: DISCONTINUED | OUTPATIENT
Start: 2017-01-13 | End: 2017-01-15 | Stop reason: HOSPADM

## 2017-01-13 RX ORDER — FUROSEMIDE 10 MG/ML
20 INJECTION INTRAMUSCULAR; INTRAVENOUS ONCE
Status: COMPLETED | OUTPATIENT
Start: 2017-01-13 | End: 2017-01-13

## 2017-01-13 RX ORDER — SENNOSIDES 8.6 MG
1 TABLET ORAL
Status: DISCONTINUED | OUTPATIENT
Start: 2017-01-13 | End: 2017-01-15 | Stop reason: HOSPADM

## 2017-01-13 RX ADMIN — CALCIUM CARBONATE (ANTACID) CHEW TAB 500 MG 500 MG: 500 CHEW TAB at 11:12

## 2017-01-13 RX ADMIN — LEVALBUTEROL HYDROCHLORIDE 0.63 MG: 0.63 SOLUTION RESPIRATORY (INHALATION) at 00:30

## 2017-01-13 RX ADMIN — LEVALBUTEROL HYDROCHLORIDE 0.63 MG: 0.63 SOLUTION RESPIRATORY (INHALATION) at 14:50

## 2017-01-13 RX ADMIN — OSELTAMIVIR PHOSPHATE 75 MG: 75 CAPSULE ORAL at 08:21

## 2017-01-13 RX ADMIN — MONTELUKAST SODIUM 10 MG: 10 TABLET, FILM COATED ORAL at 21:55

## 2017-01-13 RX ADMIN — DOCUSATE SODIUM 100 MG: 100 CAPSULE, LIQUID FILLED ORAL at 21:55

## 2017-01-13 RX ADMIN — TOLTERODINE TARTRATE 2 MG: 1 TABLET, FILM COATED ORAL at 08:20

## 2017-01-13 RX ADMIN — FLUTICASONE PROPIONATE 2 SPRAY: 50 SPRAY, METERED NASAL at 08:20

## 2017-01-13 RX ADMIN — METHYLPREDNISOLONE SODIUM SUCCINATE 40 MG: 40 INJECTION, POWDER, FOR SOLUTION INTRAMUSCULAR; INTRAVENOUS at 08:20

## 2017-01-13 RX ADMIN — APIXABAN 2.5 MG: 2.5 TABLET, FILM COATED ORAL at 17:18

## 2017-01-13 RX ADMIN — MELATONIN TAB 3 MG 3 MG: 3 TAB at 21:55

## 2017-01-13 RX ADMIN — FUROSEMIDE 20 MG: 10 INJECTION, SOLUTION INTRAMUSCULAR; INTRAVENOUS at 11:12

## 2017-01-13 RX ADMIN — METOPROLOL TARTRATE 25 MG: 25 TABLET ORAL at 03:48

## 2017-01-13 RX ADMIN — OSELTAMIVIR PHOSPHATE 75 MG: 75 CAPSULE ORAL at 21:55

## 2017-01-13 RX ADMIN — METOPROLOL TARTRATE 25 MG: 25 TABLET ORAL at 08:20

## 2017-01-13 RX ADMIN — IPRATROPIUM BROMIDE 0.5 MG: 0.5 SOLUTION RESPIRATORY (INHALATION) at 15:00

## 2017-01-13 RX ADMIN — CEFTRIAXONE SODIUM 1000 MG: 1 INJECTION, POWDER, FOR SOLUTION INTRAMUSCULAR; INTRAVENOUS at 15:39

## 2017-01-13 RX ADMIN — APIXABAN 2.5 MG: 2.5 TABLET, FILM COATED ORAL at 08:20

## 2017-01-13 RX ADMIN — AZITHROMYCIN MONOHYDRATE 500 MG: 500 INJECTION, POWDER, LYOPHILIZED, FOR SOLUTION INTRAVENOUS at 14:13

## 2017-01-13 RX ADMIN — IPRATROPIUM BROMIDE 0.5 MG: 0.5 SOLUTION RESPIRATORY (INHALATION) at 00:30

## 2017-01-13 RX ADMIN — LEVALBUTEROL HYDROCHLORIDE 0.63 MG: 0.63 SOLUTION RESPIRATORY (INHALATION) at 09:14

## 2017-01-13 RX ADMIN — METOPROLOL TARTRATE 25 MG: 25 TABLET ORAL at 14:13

## 2017-01-13 RX ADMIN — TOLTERODINE TARTRATE 2 MG: 1 TABLET, FILM COATED ORAL at 17:18

## 2017-01-13 RX ADMIN — ONDANSETRON 4 MG: 2 INJECTION INTRAMUSCULAR; INTRAVENOUS at 15:38

## 2017-01-13 RX ADMIN — SENNOSIDES 8.6 MG: 8.6 TABLET, FILM COATED ORAL at 21:55

## 2017-01-13 RX ADMIN — METOPROLOL TARTRATE 25 MG: 25 TABLET ORAL at 21:55

## 2017-01-13 RX ADMIN — IPRATROPIUM BROMIDE 0.5 MG: 0.5 SOLUTION RESPIRATORY (INHALATION) at 09:14

## 2017-01-14 LAB
ANION GAP SERPL CALCULATED.3IONS-SCNC: 8 MMOL/L (ref 4–13)
BUN SERPL-MCNC: 20 MG/DL (ref 5–25)
CALCIUM SERPL-MCNC: 7.8 MG/DL (ref 8.3–10.1)
CHLORIDE SERPL-SCNC: 95 MMOL/L (ref 100–108)
CO2 SERPL-SCNC: 28 MMOL/L (ref 21–32)
CREAT SERPL-MCNC: 0.56 MG/DL (ref 0.6–1.3)
ERYTHROCYTE [DISTWIDTH] IN BLOOD BY AUTOMATED COUNT: 12.8 % (ref 11.6–15.1)
GFR SERPL CREATININE-BSD FRML MDRD: >60 ML/MIN/1.73SQ M
GLUCOSE SERPL-MCNC: 112 MG/DL (ref 65–140)
HCT VFR BLD AUTO: 39.1 % (ref 34.8–46.1)
HGB BLD-MCNC: 12.5 G/DL (ref 11.5–15.4)
MCH RBC QN AUTO: 28.3 PG (ref 26.8–34.3)
MCHC RBC AUTO-ENTMCNC: 32 G/DL (ref 31.4–37.4)
MCV RBC AUTO: 89 FL (ref 82–98)
PLATELET # BLD AUTO: 298 THOUSANDS/UL (ref 149–390)
PMV BLD AUTO: 9.6 FL (ref 8.9–12.7)
POTASSIUM SERPL-SCNC: 4.7 MMOL/L (ref 3.5–5.3)
RBC # BLD AUTO: 4.41 MILLION/UL (ref 3.81–5.12)
SODIUM SERPL-SCNC: 131 MMOL/L (ref 136–145)
WBC # BLD AUTO: 8.67 THOUSAND/UL (ref 4.31–10.16)

## 2017-01-14 PROCEDURE — 80048 BASIC METABOLIC PNL TOTAL CA: CPT | Performed by: HOSPITALIST

## 2017-01-14 PROCEDURE — 85027 COMPLETE CBC AUTOMATED: CPT | Performed by: HOSPITALIST

## 2017-01-14 PROCEDURE — 94760 N-INVAS EAR/PLS OXIMETRY 1: CPT

## 2017-01-14 PROCEDURE — 94640 AIRWAY INHALATION TREATMENT: CPT

## 2017-01-14 RX ORDER — LORAZEPAM 1 MG/1
1 TABLET ORAL EVERY 6 HOURS PRN
Status: DISCONTINUED | OUTPATIENT
Start: 2017-01-14 | End: 2017-01-15 | Stop reason: HOSPADM

## 2017-01-14 RX ORDER — PREDNISONE 20 MG/1
40 TABLET ORAL DAILY
Status: DISCONTINUED | OUTPATIENT
Start: 2017-01-15 | End: 2017-01-15 | Stop reason: HOSPADM

## 2017-01-14 RX ADMIN — METOPROLOL TARTRATE 37.5 MG: 25 TABLET ORAL at 23:32

## 2017-01-14 RX ADMIN — SENNOSIDES 8.6 MG: 8.6 TABLET, FILM COATED ORAL at 21:31

## 2017-01-14 RX ADMIN — OSELTAMIVIR PHOSPHATE 75 MG: 75 CAPSULE ORAL at 09:48

## 2017-01-14 RX ADMIN — FLUTICASONE PROPIONATE 2 SPRAY: 50 SPRAY, METERED NASAL at 09:49

## 2017-01-14 RX ADMIN — MONTELUKAST SODIUM 10 MG: 10 TABLET, FILM COATED ORAL at 21:31

## 2017-01-14 RX ADMIN — TOLTERODINE TARTRATE 2 MG: 1 TABLET, FILM COATED ORAL at 09:48

## 2017-01-14 RX ADMIN — METOPROLOL TARTRATE 25 MG: 25 TABLET ORAL at 03:49

## 2017-01-14 RX ADMIN — LEVOFLOXACIN 750 MG: 500 TABLET, FILM COATED ORAL at 17:53

## 2017-01-14 RX ADMIN — APIXABAN 2.5 MG: 2.5 TABLET, FILM COATED ORAL at 09:45

## 2017-01-14 RX ADMIN — METOPROLOL TARTRATE 25 MG: 25 TABLET ORAL at 09:45

## 2017-01-14 RX ADMIN — MELATONIN TAB 3 MG 3 MG: 3 TAB at 21:30

## 2017-01-14 RX ADMIN — DOCUSATE SODIUM 100 MG: 100 CAPSULE, LIQUID FILLED ORAL at 09:45

## 2017-01-14 RX ADMIN — DOCUSATE SODIUM 100 MG: 100 CAPSULE, LIQUID FILLED ORAL at 17:57

## 2017-01-14 RX ADMIN — LEVALBUTEROL HYDROCHLORIDE 0.63 MG: 0.63 SOLUTION RESPIRATORY (INHALATION) at 08:11

## 2017-01-14 RX ADMIN — TOLTERODINE TARTRATE 2 MG: 1 TABLET, FILM COATED ORAL at 17:56

## 2017-01-14 RX ADMIN — IPRATROPIUM BROMIDE 0.5 MG: 0.5 SOLUTION RESPIRATORY (INHALATION) at 08:09

## 2017-01-14 RX ADMIN — IPRATROPIUM BROMIDE 0.5 MG: 0.5 SOLUTION RESPIRATORY (INHALATION) at 14:17

## 2017-01-14 RX ADMIN — LEVALBUTEROL HYDROCHLORIDE 0.63 MG: 0.63 SOLUTION RESPIRATORY (INHALATION) at 14:17

## 2017-01-14 RX ADMIN — OSELTAMIVIR PHOSPHATE 75 MG: 75 CAPSULE ORAL at 21:31

## 2017-01-14 RX ADMIN — LORAZEPAM 1 MG: 1 TABLET ORAL at 16:08

## 2017-01-14 RX ADMIN — IPRATROPIUM BROMIDE 0.5 MG: 0.5 SOLUTION RESPIRATORY (INHALATION) at 00:35

## 2017-01-14 RX ADMIN — METOPROLOL TARTRATE 5 MG: 5 INJECTION INTRAVENOUS at 16:08

## 2017-01-14 RX ADMIN — METOPROLOL TARTRATE 37.5 MG: 25 TABLET ORAL at 17:56

## 2017-01-14 RX ADMIN — METHYLPREDNISOLONE SODIUM SUCCINATE 40 MG: 40 INJECTION, POWDER, FOR SOLUTION INTRAMUSCULAR; INTRAVENOUS at 09:45

## 2017-01-14 RX ADMIN — APIXABAN 2.5 MG: 2.5 TABLET, FILM COATED ORAL at 17:55

## 2017-01-14 RX ADMIN — LEVALBUTEROL HYDROCHLORIDE 0.63 MG: 0.63 SOLUTION RESPIRATORY (INHALATION) at 00:35

## 2017-01-15 VITALS
BODY MASS INDEX: 17.98 KG/M2 | HEART RATE: 81 BPM | WEIGHT: 95.24 LBS | OXYGEN SATURATION: 94 % | TEMPERATURE: 97.2 F | DIASTOLIC BLOOD PRESSURE: 71 MMHG | SYSTOLIC BLOOD PRESSURE: 118 MMHG | HEIGHT: 61 IN | RESPIRATION RATE: 16 BRPM

## 2017-01-15 PROBLEM — R06.02 SOB (SHORTNESS OF BREATH): Status: RESOLVED | Noted: 2017-01-10 | Resolved: 2017-01-15

## 2017-01-15 PROBLEM — R65.20 SEVERE SEPSIS (HCC): Status: RESOLVED | Noted: 2017-01-10 | Resolved: 2017-01-15

## 2017-01-15 PROBLEM — A41.9 SEVERE SEPSIS (HCC): Status: RESOLVED | Noted: 2017-01-10 | Resolved: 2017-01-15

## 2017-01-15 PROBLEM — J96.90 RESPIRATORY FAILURE (HCC): Status: RESOLVED | Noted: 2017-01-10 | Resolved: 2017-01-15

## 2017-01-15 LAB
ANION GAP SERPL CALCULATED.3IONS-SCNC: 8 MMOL/L (ref 4–13)
BACTERIA BLD CULT: NORMAL
BACTERIA BLD CULT: NORMAL
BUN SERPL-MCNC: 21 MG/DL (ref 5–25)
CALCIUM SERPL-MCNC: 8.7 MG/DL (ref 8.3–10.1)
CHLORIDE SERPL-SCNC: 99 MMOL/L (ref 100–108)
CO2 SERPL-SCNC: 28 MMOL/L (ref 21–32)
CREAT SERPL-MCNC: 0.73 MG/DL (ref 0.6–1.3)
ERYTHROCYTE [DISTWIDTH] IN BLOOD BY AUTOMATED COUNT: 12.9 % (ref 11.6–15.1)
GFR SERPL CREATININE-BSD FRML MDRD: >60 ML/MIN/1.73SQ M
GLUCOSE SERPL-MCNC: 75 MG/DL (ref 65–140)
HCT VFR BLD AUTO: 43 % (ref 34.8–46.1)
HGB BLD-MCNC: 13.9 G/DL (ref 11.5–15.4)
MCH RBC QN AUTO: 28.5 PG (ref 26.8–34.3)
MCHC RBC AUTO-ENTMCNC: 32.3 G/DL (ref 31.4–37.4)
MCV RBC AUTO: 88 FL (ref 82–98)
PLATELET # BLD AUTO: 379 THOUSANDS/UL (ref 149–390)
PMV BLD AUTO: 9.4 FL (ref 8.9–12.7)
POTASSIUM SERPL-SCNC: 4.3 MMOL/L (ref 3.5–5.3)
RBC # BLD AUTO: 4.88 MILLION/UL (ref 3.81–5.12)
SODIUM SERPL-SCNC: 135 MMOL/L (ref 136–145)
WBC # BLD AUTO: 13.51 THOUSAND/UL (ref 4.31–10.16)

## 2017-01-15 PROCEDURE — 80048 BASIC METABOLIC PNL TOTAL CA: CPT | Performed by: HOSPITALIST

## 2017-01-15 PROCEDURE — 85027 COMPLETE CBC AUTOMATED: CPT | Performed by: HOSPITALIST

## 2017-01-15 PROCEDURE — 94760 N-INVAS EAR/PLS OXIMETRY 1: CPT

## 2017-01-15 PROCEDURE — 94640 AIRWAY INHALATION TREATMENT: CPT

## 2017-01-15 RX ORDER — FUROSEMIDE 20 MG/1
20 TABLET ORAL DAILY
Qty: 30 TABLET | Refills: 0 | Status: SHIPPED | OUTPATIENT
Start: 2017-01-15 | End: 2017-09-19

## 2017-01-15 RX ORDER — METOPROLOL TARTRATE 37.5 MG/1
75 TABLET, FILM COATED ORAL 2 TIMES DAILY
Qty: 120 TABLET | Refills: 0 | Status: SHIPPED | OUTPATIENT
Start: 2017-01-15 | End: 2017-03-09 | Stop reason: HOSPADM

## 2017-01-15 RX ORDER — ALBUTEROL SULFATE 90 UG/1
2 AEROSOL, METERED RESPIRATORY (INHALATION) EVERY 6 HOURS PRN
Qty: 1 INHALER | Refills: 0 | Status: SHIPPED | OUTPATIENT
Start: 2017-01-15 | End: 2017-02-14

## 2017-01-15 RX ORDER — LEVOFLOXACIN 750 MG/1
750 TABLET ORAL
Qty: 1 TABLET | Refills: 0 | Status: SHIPPED | OUTPATIENT
Start: 2017-01-15 | End: 2017-01-17

## 2017-01-15 RX ORDER — OSELTAMIVIR PHOSPHATE 75 MG/1
75 CAPSULE ORAL EVERY 12 HOURS SCHEDULED
Qty: 2 CAPSULE | Refills: 0 | Status: SHIPPED | OUTPATIENT
Start: 2017-01-15 | End: 2017-01-16

## 2017-01-15 RX ORDER — TRAMADOL HYDROCHLORIDE 50 MG/1
50 TABLET ORAL EVERY 8 HOURS PRN
Qty: 30 TABLET | Refills: 0 | Status: SHIPPED | OUTPATIENT
Start: 2017-01-15 | End: 2017-01-25

## 2017-01-15 RX ORDER — LORAZEPAM 1 MG/1
0.5 TABLET ORAL 2 TIMES DAILY PRN
Qty: 5 TABLET | Refills: 0 | Status: SHIPPED | OUTPATIENT
Start: 2017-01-15 | End: 2017-10-31

## 2017-01-15 RX ORDER — GUAIFENESIN/DEXTROMETHORPHAN 100-10MG/5
10 SYRUP ORAL EVERY 4 HOURS PRN
Qty: 118 ML | Refills: 0 | Status: SHIPPED | OUTPATIENT
Start: 2017-01-15 | End: 2017-02-14

## 2017-01-15 RX ORDER — PREDNISONE 10 MG/1
TABLET ORAL
Qty: 18 TABLET | Refills: 0 | Status: SHIPPED | OUTPATIENT
Start: 2017-01-15 | End: 2017-03-09 | Stop reason: HOSPADM

## 2017-01-15 RX ADMIN — FLUTICASONE PROPIONATE 2 SPRAY: 50 SPRAY, METERED NASAL at 09:42

## 2017-01-15 RX ADMIN — FENTANYL 50 MCG: 50 PATCH, EXTENDED RELEASE TRANSDERMAL at 09:46

## 2017-01-15 RX ADMIN — LEVALBUTEROL HYDROCHLORIDE 0.63 MG: 0.63 SOLUTION RESPIRATORY (INHALATION) at 07:58

## 2017-01-15 RX ADMIN — IPRATROPIUM BROMIDE 0.5 MG: 0.5 SOLUTION RESPIRATORY (INHALATION) at 07:58

## 2017-01-15 RX ADMIN — LORAZEPAM 1 MG: 1 TABLET ORAL at 03:33

## 2017-01-15 RX ADMIN — METOPROLOL TARTRATE 37.5 MG: 25 TABLET ORAL at 06:07

## 2017-01-15 RX ADMIN — TOLTERODINE TARTRATE 2 MG: 1 TABLET, FILM COATED ORAL at 09:41

## 2017-01-15 RX ADMIN — PREDNISONE 40 MG: 20 TABLET ORAL at 09:42

## 2017-01-15 RX ADMIN — APIXABAN 2.5 MG: 2.5 TABLET, FILM COATED ORAL at 09:41

## 2017-01-15 RX ADMIN — OSELTAMIVIR PHOSPHATE 75 MG: 75 CAPSULE ORAL at 09:42

## 2017-01-15 RX ADMIN — DOCUSATE SODIUM 100 MG: 100 CAPSULE, LIQUID FILLED ORAL at 09:42

## 2017-01-17 DIAGNOSIS — J18.9 PNEUMONIA: ICD-10-CM

## 2017-01-17 DIAGNOSIS — Z12.31 ENCOUNTER FOR SCREENING MAMMOGRAM FOR MALIGNANT NEOPLASM OF BREAST: ICD-10-CM

## 2017-01-19 ENCOUNTER — ALLSCRIPTS OFFICE VISIT (OUTPATIENT)
Dept: OTHER | Facility: OTHER | Age: 82
End: 2017-01-19

## 2017-01-30 ENCOUNTER — ALLSCRIPTS OFFICE VISIT (OUTPATIENT)
Dept: OTHER | Facility: OTHER | Age: 82
End: 2017-01-30

## 2017-02-02 ENCOUNTER — ALLSCRIPTS OFFICE VISIT (OUTPATIENT)
Dept: OTHER | Facility: OTHER | Age: 82
End: 2017-02-02

## 2017-02-08 ENCOUNTER — GENERIC CONVERSION - ENCOUNTER (OUTPATIENT)
Dept: OTHER | Facility: OTHER | Age: 82
End: 2017-02-08

## 2017-02-15 ENCOUNTER — HOSPITAL ENCOUNTER (OUTPATIENT)
Dept: RADIOLOGY | Facility: HOSPITAL | Age: 82
Discharge: HOME | End: 2017-02-15
Payer: MEDICARE

## 2017-02-15 ENCOUNTER — HOSPITAL ENCOUNTER (OUTPATIENT)
Dept: RADIOLOGY | Facility: HOSPITAL | Age: 82
End: 2017-02-15
Payer: MEDICARE

## 2017-02-15 ENCOUNTER — TRANSCRIBE ORDERS (OUTPATIENT)
Dept: ADMINISTRATIVE | Facility: HOSPITAL | Age: 82
End: 2017-02-15

## 2017-02-15 DIAGNOSIS — J18.9 PNEUMONIA: ICD-10-CM

## 2017-02-15 PROCEDURE — 71020 HB CHEST X-RAY 2VW FRONTAL&LATL: CPT

## 2017-02-17 ENCOUNTER — GENERIC CONVERSION - ENCOUNTER (OUTPATIENT)
Dept: OTHER | Facility: OTHER | Age: 82
End: 2017-02-17

## 2017-02-24 ENCOUNTER — ALLSCRIPTS OFFICE VISIT (OUTPATIENT)
Dept: OTHER | Facility: OTHER | Age: 82
End: 2017-02-24

## 2017-03-02 ENCOUNTER — ALLSCRIPTS OFFICE VISIT (OUTPATIENT)
Dept: OTHER | Facility: OTHER | Age: 82
End: 2017-03-02

## 2017-03-02 DIAGNOSIS — E03.9 HYPOTHYROIDISM: ICD-10-CM

## 2017-03-03 ENCOUNTER — APPOINTMENT (OUTPATIENT)
Dept: LAB | Facility: HOSPITAL | Age: 82
DRG: 308 | End: 2017-03-03
Payer: MEDICARE

## 2017-03-03 ENCOUNTER — APPOINTMENT (EMERGENCY)
Dept: RADIOLOGY | Facility: HOSPITAL | Age: 82
DRG: 308 | End: 2017-03-03
Payer: MEDICARE

## 2017-03-03 ENCOUNTER — ALLSCRIPTS OFFICE VISIT (OUTPATIENT)
Dept: OTHER | Facility: OTHER | Age: 82
End: 2017-03-03

## 2017-03-03 ENCOUNTER — HOSPITAL ENCOUNTER (INPATIENT)
Facility: HOSPITAL | Age: 82
LOS: 6 days | Discharge: HOME HEALTH | DRG: 308 | End: 2017-03-09
Attending: EMERGENCY MEDICINE | Admitting: INTERNAL MEDICINE
Payer: MEDICARE

## 2017-03-03 DIAGNOSIS — E03.9 HYPOTHYROIDISM: ICD-10-CM

## 2017-03-03 DIAGNOSIS — I50.9 CONGESTIVE HEART FAILURE (CHF) (HCC): Primary | ICD-10-CM

## 2017-03-03 DIAGNOSIS — I48.91 ATRIAL FIBRILLATION WITH RAPID VENTRICULAR RESPONSE (HCC): ICD-10-CM

## 2017-03-03 DIAGNOSIS — I48.91 RAPID ATRIAL FIBRILLATION (HCC): ICD-10-CM

## 2017-03-03 PROBLEM — I10 ESSENTIAL HYPERTENSION: Status: ACTIVE | Noted: 2017-03-03

## 2017-03-03 PROBLEM — J45.30 MILD PERSISTENT ASTHMA: Status: ACTIVE | Noted: 2017-03-03

## 2017-03-03 PROBLEM — I50.21 ACUTE SYSTOLIC CHF (CONGESTIVE HEART FAILURE) (HCC): Status: ACTIVE | Noted: 2017-03-03

## 2017-03-03 PROBLEM — I50.22 CHRONIC SYSTOLIC CONGESTIVE HEART FAILURE (HCC): Status: ACTIVE | Noted: 2017-03-03

## 2017-03-03 PROBLEM — F11.20 NARCOTIC DEPENDENCY, CONTINUOUS (HCC): Status: ACTIVE | Noted: 2017-03-03

## 2017-03-03 LAB
ALBUMIN SERPL BCP-MCNC: 3.2 G/DL (ref 3.5–5)
ALP SERPL-CCNC: 115 U/L (ref 46–116)
ALT SERPL W P-5'-P-CCNC: 33 U/L (ref 12–78)
ANION GAP SERPL CALCULATED.3IONS-SCNC: 11 MMOL/L (ref 4–13)
APTT PPP: 33 SECONDS (ref 24–36)
AST SERPL W P-5'-P-CCNC: 29 U/L (ref 5–45)
ATRIAL RATE: 102 BPM
ATRIAL RATE: 41 BPM
BASOPHILS # BLD AUTO: 0.02 THOUSANDS/ΜL (ref 0–0.1)
BASOPHILS NFR BLD AUTO: 0 % (ref 0–1)
BILIRUB SERPL-MCNC: 0.6 MG/DL (ref 0.2–1)
BILIRUB UR QL STRIP: NEGATIVE
BUN SERPL-MCNC: 16 MG/DL (ref 5–25)
CALCIUM SERPL-MCNC: 8.7 MG/DL (ref 8.3–10.1)
CHLORIDE SERPL-SCNC: 103 MMOL/L (ref 100–108)
CLARITY UR: CLEAR
CO2 SERPL-SCNC: 26 MMOL/L (ref 21–32)
COLOR UR: YELLOW
CREAT SERPL-MCNC: 1.03 MG/DL (ref 0.6–1.3)
EOSINOPHIL # BLD AUTO: 0.05 THOUSAND/ΜL (ref 0–0.61)
EOSINOPHIL NFR BLD AUTO: 1 % (ref 0–6)
ERYTHROCYTE [DISTWIDTH] IN BLOOD BY AUTOMATED COUNT: 15.2 % (ref 11.6–15.1)
GFR SERPL CREATININE-BSD FRML MDRD: 51.1 ML/MIN/1.73SQ M
GLUCOSE SERPL-MCNC: 101 MG/DL (ref 65–140)
GLUCOSE UR STRIP-MCNC: NEGATIVE MG/DL
HCT VFR BLD AUTO: 41.7 % (ref 34.8–46.1)
HGB BLD-MCNC: 12.6 G/DL (ref 11.5–15.4)
HGB UR QL STRIP.AUTO: NEGATIVE
INR PPP: 1.51 (ref 0.86–1.16)
KETONES UR STRIP-MCNC: NEGATIVE MG/DL
LEUKOCYTE ESTERASE UR QL STRIP: NEGATIVE
LYMPHOCYTES # BLD AUTO: 1.42 THOUSANDS/ΜL (ref 0.6–4.47)
LYMPHOCYTES NFR BLD AUTO: 14 % (ref 14–44)
MCH RBC QN AUTO: 28 PG (ref 26.8–34.3)
MCHC RBC AUTO-ENTMCNC: 30.2 G/DL (ref 31.4–37.4)
MCV RBC AUTO: 93 FL (ref 82–98)
MONOCYTES # BLD AUTO: 0.88 THOUSAND/ΜL (ref 0.17–1.22)
MONOCYTES NFR BLD AUTO: 9 % (ref 4–12)
NEUTROPHILS # BLD AUTO: 7.57 THOUSANDS/ΜL (ref 1.85–7.62)
NEUTS SEG NFR BLD AUTO: 76 % (ref 43–75)
NITRITE UR QL STRIP: NEGATIVE
NT-PROBNP SERPL-MCNC: ABNORMAL PG/ML
PH UR STRIP.AUTO: 6 [PH] (ref 4.5–8)
PLATELET # BLD AUTO: 341 THOUSANDS/UL (ref 149–390)
PMV BLD AUTO: 10.2 FL (ref 8.9–12.7)
POTASSIUM SERPL-SCNC: 4.8 MMOL/L (ref 3.5–5.3)
PROT SERPL-MCNC: 6.4 G/DL (ref 6.4–8.2)
PROT UR STRIP-MCNC: NEGATIVE MG/DL
PROTHROMBIN TIME: 17.9 SECONDS (ref 12–14.3)
QRS AXIS: 161 DEGREES
QRS AXIS: 245 DEGREES
QRSD INTERVAL: 80 MS
QRSD INTERVAL: 82 MS
QT INTERVAL: 286 MS
QT INTERVAL: 332 MS
QTC INTERVAL: 400 MS
QTC INTERVAL: 461 MS
RBC # BLD AUTO: 4.5 MILLION/UL (ref 3.81–5.12)
SODIUM SERPL-SCNC: 140 MMOL/L (ref 136–145)
SP GR UR STRIP.AUTO: 1.01 (ref 1–1.03)
T WAVE AXIS: 202 DEGREES
T WAVE AXIS: 205 DEGREES
TROPONIN I SERPL-MCNC: <0.02 NG/ML
TSH SERPL DL<=0.05 MIU/L-ACNC: 1.45 UIU/ML (ref 0.36–3.74)
UROBILINOGEN UR QL STRIP.AUTO: 0.2 E.U./DL
VENTRICULAR RATE: 116 BPM
VENTRICULAR RATE: 118 BPM
WBC # BLD AUTO: 9.94 THOUSAND/UL (ref 4.31–10.16)

## 2017-03-03 PROCEDURE — 80053 COMPREHEN METABOLIC PANEL: CPT | Performed by: PHYSICIAN ASSISTANT

## 2017-03-03 PROCEDURE — 83880 ASSAY OF NATRIURETIC PEPTIDE: CPT | Performed by: PHYSICIAN ASSISTANT

## 2017-03-03 PROCEDURE — 84443 ASSAY THYROID STIM HORMONE: CPT

## 2017-03-03 PROCEDURE — 85025 COMPLETE CBC W/AUTO DIFF WBC: CPT | Performed by: PHYSICIAN ASSISTANT

## 2017-03-03 PROCEDURE — 99285 EMERGENCY DEPT VISIT HI MDM: CPT

## 2017-03-03 PROCEDURE — 85730 THROMBOPLASTIN TIME PARTIAL: CPT | Performed by: PHYSICIAN ASSISTANT

## 2017-03-03 PROCEDURE — 84484 ASSAY OF TROPONIN QUANT: CPT | Performed by: PHYSICIAN ASSISTANT

## 2017-03-03 PROCEDURE — 96375 TX/PRO/DX INJ NEW DRUG ADDON: CPT

## 2017-03-03 PROCEDURE — 93005 ELECTROCARDIOGRAM TRACING: CPT

## 2017-03-03 PROCEDURE — 36415 COLL VENOUS BLD VENIPUNCTURE: CPT

## 2017-03-03 PROCEDURE — 93005 ELECTROCARDIOGRAM TRACING: CPT | Performed by: PHYSICIAN ASSISTANT

## 2017-03-03 PROCEDURE — 71020 HB CHEST X-RAY 2VW FRONTAL&LATL: CPT

## 2017-03-03 PROCEDURE — 94640 AIRWAY INHALATION TREATMENT: CPT

## 2017-03-03 PROCEDURE — 94760 N-INVAS EAR/PLS OXIMETRY 1: CPT

## 2017-03-03 PROCEDURE — 96374 THER/PROPH/DIAG INJ IV PUSH: CPT

## 2017-03-03 PROCEDURE — 81003 URINALYSIS AUTO W/O SCOPE: CPT | Performed by: INTERNAL MEDICINE

## 2017-03-03 PROCEDURE — 85610 PROTHROMBIN TIME: CPT | Performed by: PHYSICIAN ASSISTANT

## 2017-03-03 RX ORDER — POTASSIUM CHLORIDE 750 MG/1
20 TABLET, FILM COATED, EXTENDED RELEASE ORAL DAILY
COMMUNITY

## 2017-03-03 RX ORDER — ALBUTEROL SULFATE 2.5 MG/3ML
2.5 SOLUTION RESPIRATORY (INHALATION) EVERY 6 HOURS PRN
Status: DISCONTINUED | OUTPATIENT
Start: 2017-03-03 | End: 2017-03-09 | Stop reason: HOSPADM

## 2017-03-03 RX ORDER — FLUTICASONE PROPIONATE 50 MCG
2 SPRAY, SUSPENSION (ML) NASAL DAILY
Status: DISCONTINUED | OUTPATIENT
Start: 2017-03-03 | End: 2017-03-09 | Stop reason: HOSPADM

## 2017-03-03 RX ORDER — TOLTERODINE TARTRATE 1 MG/1
1 TABLET, EXTENDED RELEASE ORAL 2 TIMES DAILY
Status: DISCONTINUED | OUTPATIENT
Start: 2017-03-03 | End: 2017-03-09 | Stop reason: HOSPADM

## 2017-03-03 RX ORDER — METOPROLOL TARTRATE 50 MG/1
100 TABLET, FILM COATED ORAL EVERY 8 HOURS SCHEDULED
Status: DISCONTINUED | OUTPATIENT
Start: 2017-03-03 | End: 2017-03-03

## 2017-03-03 RX ORDER — ONDANSETRON 2 MG/ML
4 INJECTION INTRAMUSCULAR; INTRAVENOUS EVERY 6 HOURS PRN
Status: DISCONTINUED | OUTPATIENT
Start: 2017-03-03 | End: 2017-03-09 | Stop reason: HOSPADM

## 2017-03-03 RX ORDER — FENTANYL 50 UG/H
1 PATCH TRANSDERMAL
Status: DISCONTINUED | OUTPATIENT
Start: 2017-03-04 | End: 2017-03-09 | Stop reason: HOSPADM

## 2017-03-03 RX ORDER — MONTELUKAST SODIUM 10 MG/1
10 TABLET ORAL
Status: DISCONTINUED | OUTPATIENT
Start: 2017-03-03 | End: 2017-03-09 | Stop reason: HOSPADM

## 2017-03-03 RX ORDER — ACETAMINOPHEN 325 MG/1
650 TABLET ORAL EVERY 6 HOURS PRN
Status: DISCONTINUED | OUTPATIENT
Start: 2017-03-03 | End: 2017-03-09 | Stop reason: HOSPADM

## 2017-03-03 RX ORDER — ALBUTEROL SULFATE 2.5 MG/3ML
2.5 SOLUTION RESPIRATORY (INHALATION) EVERY 6 HOURS PRN
COMMUNITY

## 2017-03-03 RX ORDER — FUROSEMIDE 10 MG/ML
20 INJECTION INTRAMUSCULAR; INTRAVENOUS
Status: DISCONTINUED | OUTPATIENT
Start: 2017-03-03 | End: 2017-03-04

## 2017-03-03 RX ORDER — METOPROLOL SUCCINATE 50 MG/1
50 TABLET, EXTENDED RELEASE ORAL 2 TIMES DAILY
Status: DISCONTINUED | OUTPATIENT
Start: 2017-03-03 | End: 2017-03-04

## 2017-03-03 RX ORDER — LORAZEPAM 0.5 MG/1
0.5 TABLET ORAL 2 TIMES DAILY PRN
Status: DISCONTINUED | OUTPATIENT
Start: 2017-03-03 | End: 2017-03-09 | Stop reason: HOSPADM

## 2017-03-03 RX ORDER — DILTIAZEM HYDROCHLORIDE 5 MG/ML
12 INJECTION INTRAVENOUS ONCE
Status: COMPLETED | OUTPATIENT
Start: 2017-03-03 | End: 2017-03-03

## 2017-03-03 RX ORDER — FUROSEMIDE 10 MG/ML
40 INJECTION INTRAMUSCULAR; INTRAVENOUS ONCE
Status: COMPLETED | OUTPATIENT
Start: 2017-03-03 | End: 2017-03-03

## 2017-03-03 RX ADMIN — MONTELUKAST SODIUM 10 MG: 10 TABLET, FILM COATED ORAL at 21:10

## 2017-03-03 RX ADMIN — TOLTERODINE TARTRATE 1 MG: 1 TABLET, FILM COATED ORAL at 17:37

## 2017-03-03 RX ADMIN — FUROSEMIDE 20 MG: 10 INJECTION, SOLUTION INTRAMUSCULAR; INTRAVENOUS at 16:13

## 2017-03-03 RX ADMIN — FLUTICASONE PROPIONATE AND SALMETEROL 1 PUFF: 50; 250 POWDER RESPIRATORY (INHALATION) at 21:16

## 2017-03-03 RX ADMIN — DILTIAZEM HYDROCHLORIDE 12.5 MG: 5 INJECTION INTRAVENOUS at 11:02

## 2017-03-03 RX ADMIN — METOPROLOL TARTRATE 100 MG: 50 TABLET ORAL at 14:32

## 2017-03-03 RX ADMIN — FUROSEMIDE 40 MG: 10 INJECTION, SOLUTION INTRAMUSCULAR; INTRAVENOUS at 11:24

## 2017-03-03 RX ADMIN — METOPROLOL SUCCINATE 50 MG: 50 TABLET, EXTENDED RELEASE ORAL at 17:35

## 2017-03-03 RX ADMIN — FLUTICASONE PROPIONATE 2 SPRAY: 50 SPRAY, METERED NASAL at 16:15

## 2017-03-03 RX ADMIN — APIXABAN 2.5 MG: 2.5 TABLET, FILM COATED ORAL at 17:35

## 2017-03-03 RX ADMIN — LORAZEPAM 0.5 MG: 0.5 TABLET ORAL at 21:10

## 2017-03-03 RX ADMIN — NEFAZODONE HYDROCHLORIDE 150 MG: 150 TABLET ORAL at 17:36

## 2017-03-04 LAB
ANION GAP SERPL CALCULATED.3IONS-SCNC: 7 MMOL/L (ref 4–13)
BUN SERPL-MCNC: 15 MG/DL (ref 5–25)
CALCIUM SERPL-MCNC: 8.2 MG/DL (ref 8.3–10.1)
CHLORIDE SERPL-SCNC: 104 MMOL/L (ref 100–108)
CO2 SERPL-SCNC: 31 MMOL/L (ref 21–32)
CREAT SERPL-MCNC: 1.01 MG/DL (ref 0.6–1.3)
ERYTHROCYTE [DISTWIDTH] IN BLOOD BY AUTOMATED COUNT: 14.9 % (ref 11.6–15.1)
GFR SERPL CREATININE-BSD FRML MDRD: 52.2 ML/MIN/1.73SQ M
GLUCOSE SERPL-MCNC: 89 MG/DL (ref 65–140)
HCT VFR BLD AUTO: 38.8 % (ref 34.8–46.1)
HGB BLD-MCNC: 11.9 G/DL (ref 11.5–15.4)
MCH RBC QN AUTO: 28.3 PG (ref 26.8–34.3)
MCHC RBC AUTO-ENTMCNC: 30.7 G/DL (ref 31.4–37.4)
MCV RBC AUTO: 92 FL (ref 82–98)
PLATELET # BLD AUTO: 237 THOUSANDS/UL (ref 149–390)
PMV BLD AUTO: 10.1 FL (ref 8.9–12.7)
POTASSIUM SERPL-SCNC: 3.9 MMOL/L (ref 3.5–5.3)
RBC # BLD AUTO: 4.21 MILLION/UL (ref 3.81–5.12)
SODIUM SERPL-SCNC: 142 MMOL/L (ref 136–145)
WBC # BLD AUTO: 6.96 THOUSAND/UL (ref 4.31–10.16)

## 2017-03-04 PROCEDURE — 80048 BASIC METABOLIC PNL TOTAL CA: CPT | Performed by: INTERNAL MEDICINE

## 2017-03-04 PROCEDURE — 97163 PT EVAL HIGH COMPLEX 45 MIN: CPT

## 2017-03-04 PROCEDURE — G8979 MOBILITY GOAL STATUS: HCPCS

## 2017-03-04 PROCEDURE — 94760 N-INVAS EAR/PLS OXIMETRY 1: CPT

## 2017-03-04 PROCEDURE — G8978 MOBILITY CURRENT STATUS: HCPCS

## 2017-03-04 PROCEDURE — 94640 AIRWAY INHALATION TREATMENT: CPT

## 2017-03-04 PROCEDURE — 85027 COMPLETE CBC AUTOMATED: CPT | Performed by: INTERNAL MEDICINE

## 2017-03-04 PROCEDURE — 93005 ELECTROCARDIOGRAM TRACING: CPT | Performed by: INTERNAL MEDICINE

## 2017-03-04 RX ORDER — AMIODARONE HYDROCHLORIDE 50 MG/ML
INJECTION, SOLUTION INTRAVENOUS
Status: DISPENSED
Start: 2017-03-04 | End: 2017-03-04

## 2017-03-04 RX ORDER — METOPROLOL TARTRATE 50 MG/1
50 TABLET, FILM COATED ORAL EVERY 8 HOURS SCHEDULED
Status: DISCONTINUED | OUTPATIENT
Start: 2017-03-04 | End: 2017-03-04

## 2017-03-04 RX ORDER — DILTIAZEM HYDROCHLORIDE 5 MG/ML
INJECTION INTRAVENOUS
Status: DISPENSED
Start: 2017-03-04 | End: 2017-03-04

## 2017-03-04 RX ORDER — AMIODARONE HYDROCHLORIDE 900 MG/18ML
INJECTION, SOLUTION INTRAVENOUS
Status: COMPLETED
Start: 2017-03-04 | End: 2017-03-04

## 2017-03-04 RX ADMIN — METOPROLOL SUCCINATE 50 MG: 50 TABLET, EXTENDED RELEASE ORAL at 07:20

## 2017-03-04 RX ADMIN — FLUTICASONE PROPIONATE AND SALMETEROL 1 PUFF: 50; 250 POWDER RESPIRATORY (INHALATION) at 20:51

## 2017-03-04 RX ADMIN — FLUTICASONE PROPIONATE AND SALMETEROL 1 PUFF: 50; 250 POWDER RESPIRATORY (INHALATION) at 08:17

## 2017-03-04 RX ADMIN — AMIODARONE HYDROCHLORIDE 1 MG: 50 INJECTION, SOLUTION INTRAVENOUS at 11:43

## 2017-03-04 RX ADMIN — FUROSEMIDE 20 MG: 10 INJECTION, SOLUTION INTRAMUSCULAR; INTRAVENOUS at 07:15

## 2017-03-04 RX ADMIN — FENTANYL 1 PATCH: 50 PATCH, EXTENDED RELEASE TRANSDERMAL at 08:47

## 2017-03-04 RX ADMIN — APIXABAN 2.5 MG: 2.5 TABLET, FILM COATED ORAL at 08:47

## 2017-03-04 RX ADMIN — NEFAZODONE HYDROCHLORIDE 150 MG: 150 TABLET ORAL at 18:04

## 2017-03-04 RX ADMIN — APIXABAN 2.5 MG: 2.5 TABLET, FILM COATED ORAL at 18:04

## 2017-03-04 RX ADMIN — LORAZEPAM 0.5 MG: 0.5 TABLET ORAL at 22:14

## 2017-03-04 RX ADMIN — AMIODARONE HYDROCHLORIDE 1 MG/MIN: 50 INJECTION, SOLUTION INTRAVENOUS at 11:43

## 2017-03-04 RX ADMIN — METOPROLOL TARTRATE 5 MG: 5 INJECTION INTRAVENOUS at 07:54

## 2017-03-04 RX ADMIN — MONTELUKAST SODIUM 10 MG: 10 TABLET, FILM COATED ORAL at 22:14

## 2017-03-04 RX ADMIN — FLUTICASONE PROPIONATE 2 SPRAY: 50 SPRAY, METERED NASAL at 08:48

## 2017-03-04 RX ADMIN — TOLTERODINE TARTRATE 1 MG: 1 TABLET, FILM COATED ORAL at 08:48

## 2017-03-04 RX ADMIN — TOLTERODINE TARTRATE 1 MG: 1 TABLET, FILM COATED ORAL at 18:04

## 2017-03-04 RX ADMIN — NEFAZODONE HYDROCHLORIDE 150 MG: 150 TABLET ORAL at 08:48

## 2017-03-04 RX ADMIN — AMIODARONE HYDROCHLORIDE 150 MG: 50 INJECTION, SOLUTION INTRAVENOUS at 11:27

## 2017-03-04 RX ADMIN — DILTIAZEM HYDROCHLORIDE 5 MG/HR: 5 INJECTION INTRAVENOUS at 10:35

## 2017-03-05 LAB
ANION GAP SERPL CALCULATED.3IONS-SCNC: 8 MMOL/L (ref 4–13)
BUN SERPL-MCNC: 15 MG/DL (ref 5–25)
CALCIUM SERPL-MCNC: 7.8 MG/DL (ref 8.3–10.1)
CHLORIDE SERPL-SCNC: 105 MMOL/L (ref 100–108)
CO2 SERPL-SCNC: 29 MMOL/L (ref 21–32)
CREAT SERPL-MCNC: 0.92 MG/DL (ref 0.6–1.3)
ERYTHROCYTE [DISTWIDTH] IN BLOOD BY AUTOMATED COUNT: 15.2 % (ref 11.6–15.1)
GFR SERPL CREATININE-BSD FRML MDRD: 58.2 ML/MIN/1.73SQ M
GLUCOSE SERPL-MCNC: 104 MG/DL (ref 65–140)
HCT VFR BLD AUTO: 35.6 % (ref 34.8–46.1)
HGB BLD-MCNC: 10.6 G/DL (ref 11.5–15.4)
MCH RBC QN AUTO: 27.7 PG (ref 26.8–34.3)
MCHC RBC AUTO-ENTMCNC: 29.8 G/DL (ref 31.4–37.4)
MCV RBC AUTO: 93 FL (ref 82–98)
PLATELET # BLD AUTO: 224 THOUSANDS/UL (ref 149–390)
PMV BLD AUTO: 10.2 FL (ref 8.9–12.7)
POTASSIUM SERPL-SCNC: 3.5 MMOL/L (ref 3.5–5.3)
RBC # BLD AUTO: 3.82 MILLION/UL (ref 3.81–5.12)
SODIUM SERPL-SCNC: 142 MMOL/L (ref 136–145)
WBC # BLD AUTO: 6.94 THOUSAND/UL (ref 4.31–10.16)

## 2017-03-05 PROCEDURE — 94760 N-INVAS EAR/PLS OXIMETRY 1: CPT

## 2017-03-05 PROCEDURE — 94640 AIRWAY INHALATION TREATMENT: CPT

## 2017-03-05 PROCEDURE — 85027 COMPLETE CBC AUTOMATED: CPT | Performed by: INTERNAL MEDICINE

## 2017-03-05 PROCEDURE — 80048 BASIC METABOLIC PNL TOTAL CA: CPT | Performed by: INTERNAL MEDICINE

## 2017-03-05 RX ORDER — LORAZEPAM 0.5 MG/1
0.5 TABLET ORAL 2 TIMES DAILY
Status: DISCONTINUED | OUTPATIENT
Start: 2017-03-05 | End: 2017-03-09 | Stop reason: HOSPADM

## 2017-03-05 RX ORDER — AMIODARONE HYDROCHLORIDE 200 MG/1
200 TABLET ORAL
Status: DISCONTINUED | OUTPATIENT
Start: 2017-03-05 | End: 2017-03-06

## 2017-03-05 RX ORDER — AMIODARONE HYDROCHLORIDE 200 MG/1
200 TABLET ORAL
Status: DISCONTINUED | OUTPATIENT
Start: 2017-03-06 | End: 2017-03-05

## 2017-03-05 RX ADMIN — TOLTERODINE TARTRATE 1 MG: 1 TABLET, FILM COATED ORAL at 17:30

## 2017-03-05 RX ADMIN — METOPROLOL TARTRATE 2.5 MG: 5 INJECTION INTRAVENOUS at 21:38

## 2017-03-05 RX ADMIN — NEFAZODONE HYDROCHLORIDE 150 MG: 150 TABLET ORAL at 17:30

## 2017-03-05 RX ADMIN — APIXABAN 2.5 MG: 2.5 TABLET, FILM COATED ORAL at 09:26

## 2017-03-05 RX ADMIN — TOLTERODINE TARTRATE 1 MG: 1 TABLET, FILM COATED ORAL at 09:26

## 2017-03-05 RX ADMIN — MONTELUKAST SODIUM 10 MG: 10 TABLET, FILM COATED ORAL at 21:07

## 2017-03-05 RX ADMIN — FLUTICASONE PROPIONATE AND SALMETEROL 1 PUFF: 50; 250 POWDER RESPIRATORY (INHALATION) at 20:52

## 2017-03-05 RX ADMIN — LORAZEPAM 0.5 MG: 0.5 TABLET ORAL at 21:07

## 2017-03-05 RX ADMIN — FLUTICASONE PROPIONATE AND SALMETEROL 1 PUFF: 50; 250 POWDER RESPIRATORY (INHALATION) at 09:19

## 2017-03-05 RX ADMIN — LORAZEPAM 0.5 MG: 0.5 TABLET ORAL at 13:56

## 2017-03-05 RX ADMIN — LORAZEPAM 0.5 MG: 0.5 TABLET ORAL at 17:29

## 2017-03-05 RX ADMIN — AMIODARONE HYDROCHLORIDE 200 MG: 200 TABLET ORAL at 13:56

## 2017-03-05 RX ADMIN — FLUTICASONE PROPIONATE 2 SPRAY: 50 SPRAY, METERED NASAL at 09:26

## 2017-03-05 RX ADMIN — APIXABAN 2.5 MG: 2.5 TABLET, FILM COATED ORAL at 17:29

## 2017-03-05 RX ADMIN — NEFAZODONE HYDROCHLORIDE 150 MG: 150 TABLET ORAL at 09:26

## 2017-03-06 LAB
ATRIAL RATE: 122 BPM
QRS AXIS: -2 DEGREES
QRSD INTERVAL: 84 MS
QT INTERVAL: 312 MS
QTC INTERVAL: 484 MS
T WAVE AXIS: 205 DEGREES
VENTRICULAR RATE: 145 BPM

## 2017-03-06 PROCEDURE — 97110 THERAPEUTIC EXERCISES: CPT

## 2017-03-06 PROCEDURE — 94640 AIRWAY INHALATION TREATMENT: CPT

## 2017-03-06 PROCEDURE — 97116 GAIT TRAINING THERAPY: CPT

## 2017-03-06 PROCEDURE — 94760 N-INVAS EAR/PLS OXIMETRY 1: CPT

## 2017-03-06 RX ORDER — FUROSEMIDE 20 MG/1
20 TABLET ORAL DAILY
Status: DISCONTINUED | OUTPATIENT
Start: 2017-03-06 | End: 2017-03-09 | Stop reason: HOSPADM

## 2017-03-06 RX ORDER — METOPROLOL TARTRATE 50 MG/1
50 TABLET, FILM COATED ORAL EVERY 12 HOURS SCHEDULED
Status: DISCONTINUED | OUTPATIENT
Start: 2017-03-06 | End: 2017-03-07

## 2017-03-06 RX ORDER — DIGOXIN 250 MCG
250 TABLET ORAL ONCE
Status: COMPLETED | OUTPATIENT
Start: 2017-03-06 | End: 2017-03-06

## 2017-03-06 RX ADMIN — LORAZEPAM 0.5 MG: 0.5 TABLET ORAL at 17:40

## 2017-03-06 RX ADMIN — TOLTERODINE TARTRATE 1 MG: 1 TABLET, FILM COATED ORAL at 11:59

## 2017-03-06 RX ADMIN — FLUTICASONE PROPIONATE AND SALMETEROL 1 PUFF: 50; 250 POWDER RESPIRATORY (INHALATION) at 20:48

## 2017-03-06 RX ADMIN — DIGOXIN 250 MCG: 0.25 TABLET ORAL at 14:47

## 2017-03-06 RX ADMIN — METOPROLOL TARTRATE 50 MG: 50 TABLET ORAL at 20:13

## 2017-03-06 RX ADMIN — APIXABAN 2.5 MG: 2.5 TABLET, FILM COATED ORAL at 17:40

## 2017-03-06 RX ADMIN — MONTELUKAST SODIUM 10 MG: 10 TABLET, FILM COATED ORAL at 22:20

## 2017-03-06 RX ADMIN — FLUTICASONE PROPIONATE 2 SPRAY: 50 SPRAY, METERED NASAL at 09:23

## 2017-03-06 RX ADMIN — METOPROLOL TARTRATE 50 MG: 50 TABLET ORAL at 13:39

## 2017-03-06 RX ADMIN — FUROSEMIDE 20 MG: 20 TABLET ORAL at 09:22

## 2017-03-06 RX ADMIN — APIXABAN 2.5 MG: 2.5 TABLET, FILM COATED ORAL at 09:22

## 2017-03-06 RX ADMIN — NEFAZODONE HYDROCHLORIDE 150 MG: 150 TABLET ORAL at 11:59

## 2017-03-06 RX ADMIN — NEFAZODONE HYDROCHLORIDE 150 MG: 150 TABLET ORAL at 17:40

## 2017-03-06 RX ADMIN — TOLTERODINE TARTRATE 1 MG: 1 TABLET, FILM COATED ORAL at 17:41

## 2017-03-06 RX ADMIN — FLUTICASONE PROPIONATE AND SALMETEROL 1 PUFF: 50; 250 POWDER RESPIRATORY (INHALATION) at 08:44

## 2017-03-06 RX ADMIN — LORAZEPAM 0.5 MG: 0.5 TABLET ORAL at 20:14

## 2017-03-06 RX ADMIN — DIGOXIN 250 MCG: 0.25 TABLET ORAL at 09:22

## 2017-03-06 RX ADMIN — LORAZEPAM 0.5 MG: 0.5 TABLET ORAL at 09:22

## 2017-03-07 ENCOUNTER — APPOINTMENT (INPATIENT)
Dept: RADIOLOGY | Facility: HOSPITAL | Age: 82
DRG: 308 | End: 2017-03-07
Payer: MEDICARE

## 2017-03-07 LAB
ANION GAP SERPL CALCULATED.3IONS-SCNC: 8 MMOL/L (ref 4–13)
BUN SERPL-MCNC: 12 MG/DL (ref 5–25)
CALCIUM SERPL-MCNC: 8.1 MG/DL (ref 8.3–10.1)
CHLORIDE SERPL-SCNC: 105 MMOL/L (ref 100–108)
CO2 SERPL-SCNC: 29 MMOL/L (ref 21–32)
CREAT SERPL-MCNC: 0.77 MG/DL (ref 0.6–1.3)
DIGOXIN SERPL-MCNC: 1.1 NG/ML (ref 0.8–2)
ERYTHROCYTE [DISTWIDTH] IN BLOOD BY AUTOMATED COUNT: 15 % (ref 11.6–15.1)
GFR SERPL CREATININE-BSD FRML MDRD: >60 ML/MIN/1.73SQ M
GLUCOSE SERPL-MCNC: 94 MG/DL (ref 65–140)
HCT VFR BLD AUTO: 41.7 % (ref 34.8–46.1)
HGB BLD-MCNC: 12.4 G/DL (ref 11.5–15.4)
MCH RBC QN AUTO: 27.7 PG (ref 26.8–34.3)
MCHC RBC AUTO-ENTMCNC: 29.7 G/DL (ref 31.4–37.4)
MCV RBC AUTO: 93 FL (ref 82–98)
PLATELET # BLD AUTO: 258 THOUSANDS/UL (ref 149–390)
PMV BLD AUTO: 10 FL (ref 8.9–12.7)
POTASSIUM SERPL-SCNC: 3.8 MMOL/L (ref 3.5–5.3)
RBC # BLD AUTO: 4.47 MILLION/UL (ref 3.81–5.12)
SODIUM SERPL-SCNC: 142 MMOL/L (ref 136–145)
WBC # BLD AUTO: 7.35 THOUSAND/UL (ref 4.31–10.16)

## 2017-03-07 PROCEDURE — 85027 COMPLETE CBC AUTOMATED: CPT | Performed by: HOSPITALIST

## 2017-03-07 PROCEDURE — 71010 HB CHEST X-RAY 1 VIEW FRONTAL (PORTABLE): CPT

## 2017-03-07 PROCEDURE — 94760 N-INVAS EAR/PLS OXIMETRY 1: CPT

## 2017-03-07 PROCEDURE — 80162 ASSAY OF DIGOXIN TOTAL: CPT | Performed by: HOSPITALIST

## 2017-03-07 PROCEDURE — 94640 AIRWAY INHALATION TREATMENT: CPT

## 2017-03-07 PROCEDURE — 80048 BASIC METABOLIC PNL TOTAL CA: CPT | Performed by: HOSPITALIST

## 2017-03-07 RX ORDER — METOPROLOL TARTRATE 50 MG/1
50 TABLET, FILM COATED ORAL ONCE
Status: COMPLETED | OUTPATIENT
Start: 2017-03-07 | End: 2017-03-07

## 2017-03-07 RX ORDER — DIGOXIN 125 MCG
125 TABLET ORAL DAILY
Status: DISCONTINUED | OUTPATIENT
Start: 2017-03-07 | End: 2017-03-07

## 2017-03-07 RX ORDER — DIGOXIN 125 MCG
125 TABLET ORAL DAILY
Status: DISCONTINUED | OUTPATIENT
Start: 2017-03-07 | End: 2017-03-09 | Stop reason: HOSPADM

## 2017-03-07 RX ORDER — SENNOSIDES 8.6 MG
1 TABLET ORAL DAILY
Status: DISCONTINUED | OUTPATIENT
Start: 2017-03-07 | End: 2017-03-09 | Stop reason: HOSPADM

## 2017-03-07 RX ORDER — METOPROLOL TARTRATE 50 MG/1
100 TABLET, FILM COATED ORAL EVERY 12 HOURS SCHEDULED
Status: DISCONTINUED | OUTPATIENT
Start: 2017-03-07 | End: 2017-03-08

## 2017-03-07 RX ORDER — METOPROLOL TARTRATE 50 MG/1
50 TABLET, FILM COATED ORAL EVERY 6 HOURS
Status: DISCONTINUED | OUTPATIENT
Start: 2017-03-07 | End: 2017-03-07

## 2017-03-07 RX ADMIN — FLUTICASONE PROPIONATE 2 SPRAY: 50 SPRAY, METERED NASAL at 08:09

## 2017-03-07 RX ADMIN — METOPROLOL TARTRATE 50 MG: 50 TABLET ORAL at 08:09

## 2017-03-07 RX ADMIN — METOPROLOL TARTRATE 100 MG: 50 TABLET ORAL at 21:12

## 2017-03-07 RX ADMIN — NEFAZODONE HYDROCHLORIDE 150 MG: 150 TABLET ORAL at 08:09

## 2017-03-07 RX ADMIN — DIGOXIN 125 MCG: 125 TABLET ORAL at 09:52

## 2017-03-07 RX ADMIN — LORAZEPAM 0.5 MG: 0.5 TABLET ORAL at 17:11

## 2017-03-07 RX ADMIN — MONTELUKAST SODIUM 10 MG: 10 TABLET, FILM COATED ORAL at 21:12

## 2017-03-07 RX ADMIN — FLUTICASONE PROPIONATE AND SALMETEROL 1 PUFF: 50; 250 POWDER RESPIRATORY (INHALATION) at 20:26

## 2017-03-07 RX ADMIN — METOPROLOL TARTRATE 50 MG: 50 TABLET ORAL at 09:34

## 2017-03-07 RX ADMIN — APIXABAN 2.5 MG: 2.5 TABLET, FILM COATED ORAL at 17:10

## 2017-03-07 RX ADMIN — FUROSEMIDE 20 MG: 20 TABLET ORAL at 08:09

## 2017-03-07 RX ADMIN — LORAZEPAM 0.5 MG: 0.5 TABLET ORAL at 08:09

## 2017-03-07 RX ADMIN — FLUTICASONE PROPIONATE AND SALMETEROL 1 PUFF: 50; 250 POWDER RESPIRATORY (INHALATION) at 08:19

## 2017-03-07 RX ADMIN — SENNOSIDES 8.6 MG: 8.6 TABLET, FILM COATED ORAL at 10:16

## 2017-03-07 RX ADMIN — TOLTERODINE TARTRATE 1 MG: 1 TABLET, FILM COATED ORAL at 17:11

## 2017-03-07 RX ADMIN — TOLTERODINE TARTRATE 1 MG: 1 TABLET, FILM COATED ORAL at 08:09

## 2017-03-07 RX ADMIN — APIXABAN 2.5 MG: 2.5 TABLET, FILM COATED ORAL at 08:09

## 2017-03-07 RX ADMIN — FENTANYL 1 PATCH: 50 PATCH, EXTENDED RELEASE TRANSDERMAL at 08:09

## 2017-03-07 RX ADMIN — NEFAZODONE HYDROCHLORIDE 150 MG: 150 TABLET ORAL at 17:11

## 2017-03-08 ENCOUNTER — APPOINTMENT (INPATIENT)
Dept: OCCUPATIONAL THERAPY | Facility: HOSPITAL | Age: 82
DRG: 308 | End: 2017-03-08
Payer: MEDICARE

## 2017-03-08 LAB
ANION GAP SERPL CALCULATED.3IONS-SCNC: 7 MMOL/L (ref 4–13)
BUN SERPL-MCNC: 13 MG/DL (ref 5–25)
CALCIUM SERPL-MCNC: 8 MG/DL (ref 8.3–10.1)
CHLORIDE SERPL-SCNC: 104 MMOL/L (ref 100–108)
CO2 SERPL-SCNC: 30 MMOL/L (ref 21–32)
CREAT SERPL-MCNC: 0.74 MG/DL (ref 0.6–1.3)
ERYTHROCYTE [DISTWIDTH] IN BLOOD BY AUTOMATED COUNT: 14.9 % (ref 11.6–15.1)
GFR SERPL CREATININE-BSD FRML MDRD: >60 ML/MIN/1.73SQ M
GLUCOSE SERPL-MCNC: 87 MG/DL (ref 65–140)
HCT VFR BLD AUTO: 41.4 % (ref 34.8–46.1)
HGB BLD-MCNC: 12.6 G/DL (ref 11.5–15.4)
MCH RBC QN AUTO: 28.4 PG (ref 26.8–34.3)
MCHC RBC AUTO-ENTMCNC: 30.4 G/DL (ref 31.4–37.4)
MCV RBC AUTO: 94 FL (ref 82–98)
PLATELET # BLD AUTO: 242 THOUSANDS/UL (ref 149–390)
PMV BLD AUTO: 9.9 FL (ref 8.9–12.7)
POTASSIUM SERPL-SCNC: 3.7 MMOL/L (ref 3.5–5.3)
RBC # BLD AUTO: 4.43 MILLION/UL (ref 3.81–5.12)
SODIUM SERPL-SCNC: 141 MMOL/L (ref 136–145)
WBC # BLD AUTO: 7.24 THOUSAND/UL (ref 4.31–10.16)

## 2017-03-08 PROCEDURE — 94640 AIRWAY INHALATION TREATMENT: CPT

## 2017-03-08 PROCEDURE — 80048 BASIC METABOLIC PNL TOTAL CA: CPT | Performed by: HOSPITALIST

## 2017-03-08 PROCEDURE — 94760 N-INVAS EAR/PLS OXIMETRY 1: CPT

## 2017-03-08 PROCEDURE — 85027 COMPLETE CBC AUTOMATED: CPT | Performed by: HOSPITALIST

## 2017-03-08 RX ORDER — METOPROLOL SUCCINATE 50 MG/1
50 TABLET, EXTENDED RELEASE ORAL 2 TIMES DAILY
Status: DISCONTINUED | OUTPATIENT
Start: 2017-03-08 | End: 2017-03-09 | Stop reason: HOSPADM

## 2017-03-08 RX ADMIN — FLUTICASONE PROPIONATE AND SALMETEROL 1 PUFF: 50; 250 POWDER RESPIRATORY (INHALATION) at 07:38

## 2017-03-08 RX ADMIN — METOPROLOL SUCCINATE 50 MG: 50 TABLET, EXTENDED RELEASE ORAL at 18:00

## 2017-03-08 RX ADMIN — METOPROLOL SUCCINATE 50 MG: 50 TABLET, EXTENDED RELEASE ORAL at 08:55

## 2017-03-08 RX ADMIN — NEFAZODONE HYDROCHLORIDE 150 MG: 150 TABLET ORAL at 18:00

## 2017-03-08 RX ADMIN — FUROSEMIDE 20 MG: 20 TABLET ORAL at 08:46

## 2017-03-08 RX ADMIN — MONTELUKAST SODIUM 10 MG: 10 TABLET, FILM COATED ORAL at 21:40

## 2017-03-08 RX ADMIN — APIXABAN 2.5 MG: 2.5 TABLET, FILM COATED ORAL at 08:46

## 2017-03-08 RX ADMIN — LORAZEPAM 0.5 MG: 0.5 TABLET ORAL at 08:46

## 2017-03-08 RX ADMIN — LORAZEPAM 0.5 MG: 0.5 TABLET ORAL at 18:00

## 2017-03-08 RX ADMIN — TOLTERODINE TARTRATE 1 MG: 1 TABLET, FILM COATED ORAL at 08:47

## 2017-03-08 RX ADMIN — SENNOSIDES 8.6 MG: 8.6 TABLET, FILM COATED ORAL at 08:46

## 2017-03-08 RX ADMIN — DIGOXIN 125 MCG: 125 TABLET ORAL at 08:46

## 2017-03-08 RX ADMIN — NEFAZODONE HYDROCHLORIDE 150 MG: 150 TABLET ORAL at 08:47

## 2017-03-08 RX ADMIN — TOLTERODINE TARTRATE 1 MG: 1 TABLET, FILM COATED ORAL at 18:00

## 2017-03-08 RX ADMIN — APIXABAN 2.5 MG: 2.5 TABLET, FILM COATED ORAL at 18:00

## 2017-03-08 RX ADMIN — FLUTICASONE PROPIONATE 2 SPRAY: 50 SPRAY, METERED NASAL at 08:48

## 2017-03-08 RX ADMIN — FLUTICASONE PROPIONATE AND SALMETEROL 1 PUFF: 50; 250 POWDER RESPIRATORY (INHALATION) at 19:58

## 2017-03-09 VITALS
DIASTOLIC BLOOD PRESSURE: 78 MMHG | HEIGHT: 62 IN | HEART RATE: 74 BPM | SYSTOLIC BLOOD PRESSURE: 144 MMHG | RESPIRATION RATE: 16 BRPM | BODY MASS INDEX: 18.78 KG/M2 | TEMPERATURE: 97.8 F | OXYGEN SATURATION: 97 % | WEIGHT: 102.07 LBS

## 2017-03-09 DIAGNOSIS — J18.9 PNEUMONIA: ICD-10-CM

## 2017-03-09 PROCEDURE — 94640 AIRWAY INHALATION TREATMENT: CPT

## 2017-03-09 PROCEDURE — 94760 N-INVAS EAR/PLS OXIMETRY 1: CPT

## 2017-03-09 RX ORDER — METOPROLOL SUCCINATE 50 MG/1
50 TABLET, EXTENDED RELEASE ORAL 2 TIMES DAILY
Qty: 60 TABLET | Refills: 0 | Status: SHIPPED | OUTPATIENT
Start: 2017-03-09 | End: 2017-09-19

## 2017-03-09 RX ORDER — DIGOXIN 125 MCG
125 TABLET ORAL DAILY
Qty: 30 TABLET | Refills: 0 | Status: SHIPPED | OUTPATIENT
Start: 2017-03-09 | End: 2017-09-19

## 2017-03-09 RX ADMIN — FLUTICASONE PROPIONATE AND SALMETEROL 1 PUFF: 50; 250 POWDER RESPIRATORY (INHALATION) at 07:26

## 2017-03-09 RX ADMIN — APIXABAN 2.5 MG: 2.5 TABLET, FILM COATED ORAL at 09:30

## 2017-03-09 RX ADMIN — LORAZEPAM 0.5 MG: 0.5 TABLET ORAL at 09:31

## 2017-03-09 RX ADMIN — SENNOSIDES 8.6 MG: 8.6 TABLET, FILM COATED ORAL at 09:31

## 2017-03-09 RX ADMIN — METOPROLOL SUCCINATE 50 MG: 50 TABLET, EXTENDED RELEASE ORAL at 09:31

## 2017-03-09 RX ADMIN — NEFAZODONE HYDROCHLORIDE 150 MG: 150 TABLET ORAL at 09:36

## 2017-03-09 RX ADMIN — DIGOXIN 125 MCG: 125 TABLET ORAL at 09:30

## 2017-03-09 RX ADMIN — FUROSEMIDE 20 MG: 20 TABLET ORAL at 09:30

## 2017-03-09 RX ADMIN — TOLTERODINE TARTRATE 1 MG: 1 TABLET, FILM COATED ORAL at 09:37

## 2017-03-10 ENCOUNTER — GENERIC CONVERSION - ENCOUNTER (OUTPATIENT)
Dept: OTHER | Facility: OTHER | Age: 82
End: 2017-03-10

## 2017-03-15 ENCOUNTER — LAB REQUISITION (OUTPATIENT)
Dept: LAB | Facility: HOSPITAL | Age: 82
End: 2017-03-15
Payer: MEDICARE

## 2017-03-15 DIAGNOSIS — I50.23 ACUTE ON CHRONIC SYSTOLIC CONGESTIVE HEART FAILURE (HCC): ICD-10-CM

## 2017-03-15 DIAGNOSIS — I48.91 ATRIAL FIBRILLATION (HCC): ICD-10-CM

## 2017-03-15 LAB — DIGOXIN SERPL-MCNC: 0.6 NG/ML (ref 0.8–2)

## 2017-03-15 PROCEDURE — 80162 ASSAY OF DIGOXIN TOTAL: CPT | Performed by: INTERNAL MEDICINE

## 2017-03-24 ENCOUNTER — ALLSCRIPTS OFFICE VISIT (OUTPATIENT)
Dept: OTHER | Facility: OTHER | Age: 82
End: 2017-03-24

## 2017-03-27 ENCOUNTER — GENERIC CONVERSION - ENCOUNTER (OUTPATIENT)
Dept: OTHER | Facility: OTHER | Age: 82
End: 2017-03-27

## 2017-03-31 ENCOUNTER — GENERIC CONVERSION - ENCOUNTER (OUTPATIENT)
Dept: OTHER | Facility: OTHER | Age: 82
End: 2017-03-31

## 2017-04-21 ENCOUNTER — GENERIC CONVERSION - ENCOUNTER (OUTPATIENT)
Dept: OTHER | Facility: OTHER | Age: 82
End: 2017-04-21

## 2017-05-18 ENCOUNTER — GENERIC CONVERSION - ENCOUNTER (OUTPATIENT)
Dept: OTHER | Facility: OTHER | Age: 82
End: 2017-05-18

## 2017-06-02 ENCOUNTER — ALLSCRIPTS OFFICE VISIT (OUTPATIENT)
Dept: OTHER | Facility: OTHER | Age: 82
End: 2017-06-02

## 2017-06-26 ENCOUNTER — GENERIC CONVERSION - ENCOUNTER (OUTPATIENT)
Dept: OTHER | Facility: OTHER | Age: 82
End: 2017-06-26

## 2017-08-07 ENCOUNTER — TRANSCRIBE ORDERS (OUTPATIENT)
Dept: ADMINISTRATIVE | Facility: HOSPITAL | Age: 82
End: 2017-08-07

## 2017-08-07 ENCOUNTER — HOSPITAL ENCOUNTER (OUTPATIENT)
Dept: RADIOLOGY | Facility: HOSPITAL | Age: 82
Discharge: HOME | DRG: 871 | End: 2017-08-07
Attending: INTERNAL MEDICINE
Payer: MEDICARE

## 2017-08-07 DIAGNOSIS — C43.9 MALIGNANT MELANOMA, UNSPECIFIED SITE (HCC): ICD-10-CM

## 2017-08-07 DIAGNOSIS — C43.9 MALIGNANT MELANOMA, UNSPECIFIED SITE (HCC): Primary | ICD-10-CM

## 2017-08-07 PROCEDURE — 71020 HB CHEST X-RAY 2VW FRONTAL&LATL: CPT

## 2017-08-08 ENCOUNTER — ALLSCRIPTS OFFICE VISIT (OUTPATIENT)
Dept: OTHER | Facility: OTHER | Age: 82
End: 2017-08-08

## 2017-08-09 ENCOUNTER — HOSPITAL ENCOUNTER (INPATIENT)
Facility: HOSPITAL | Age: 82
LOS: 2 days | Discharge: HOME HEALTH | DRG: 871 | End: 2017-08-11
Attending: EMERGENCY MEDICINE | Admitting: FAMILY MEDICINE
Payer: MEDICARE

## 2017-08-09 ENCOUNTER — APPOINTMENT (EMERGENCY)
Dept: RADIOLOGY | Facility: HOSPITAL | Age: 82
DRG: 871 | End: 2017-08-09
Payer: MEDICARE

## 2017-08-09 DIAGNOSIS — J18.9 PNEUMONIA: Primary | ICD-10-CM

## 2017-08-09 DIAGNOSIS — I50.22 CHRONIC SYSTOLIC CONGESTIVE HEART FAILURE (HCC): ICD-10-CM

## 2017-08-09 PROBLEM — E87.1 HYPONATREMIA: Status: ACTIVE | Noted: 2017-08-09

## 2017-08-09 PROBLEM — F41.9 ANXIETY: Status: ACTIVE | Noted: 2017-08-09

## 2017-08-09 PROBLEM — R82.90 ABNORMAL URINALYSIS: Status: ACTIVE | Noted: 2017-08-09

## 2017-08-09 PROBLEM — D72.829 LEUKOCYTOSIS: Status: ACTIVE | Noted: 2017-08-09

## 2017-08-09 PROBLEM — E03.9 HYPOTHYROIDISM: Status: RESOLVED | Noted: 2017-03-03 | Resolved: 2017-08-09

## 2017-08-09 LAB
ALBUMIN SERPL BCP-MCNC: 3.1 G/DL (ref 3.5–5)
ALP SERPL-CCNC: 97 U/L (ref 46–116)
ALT SERPL W P-5'-P-CCNC: 18 U/L (ref 12–78)
ANION GAP SERPL CALCULATED.3IONS-SCNC: 11 MMOL/L (ref 4–13)
APTT PPP: 34 SECONDS (ref 23–35)
AST SERPL W P-5'-P-CCNC: 24 U/L (ref 5–45)
BACTERIA UR QL AUTO: ABNORMAL /HPF
BASOPHILS # BLD MANUAL: 0 THOUSAND/UL (ref 0–0.1)
BASOPHILS NFR MAR MANUAL: 0 % (ref 0–1)
BILIRUB SERPL-MCNC: 1.1 MG/DL (ref 0.2–1)
BILIRUB UR QL STRIP: NEGATIVE
BUN SERPL-MCNC: 19 MG/DL (ref 5–25)
CALCIUM SERPL-MCNC: 8.6 MG/DL (ref 8.3–10.1)
CHLORIDE SERPL-SCNC: 94 MMOL/L (ref 100–108)
CLARITY UR: CLEAR
CO2 SERPL-SCNC: 30 MMOL/L (ref 21–32)
COLOR UR: YELLOW
CREAT SERPL-MCNC: 0.92 MG/DL (ref 0.6–1.3)
EOSINOPHIL # BLD MANUAL: 0 THOUSAND/UL (ref 0–0.4)
EOSINOPHIL NFR BLD MANUAL: 0 % (ref 0–6)
ERYTHROCYTE [DISTWIDTH] IN BLOOD BY AUTOMATED COUNT: 14.1 % (ref 11.6–15.1)
GFR SERPL CREATININE-BSD FRML MDRD: 57 ML/MIN/1.73SQ M
GLUCOSE SERPL-MCNC: 119 MG/DL (ref 65–140)
GLUCOSE UR STRIP-MCNC: NEGATIVE MG/DL
HCT VFR BLD AUTO: 42.2 % (ref 34.8–46.1)
HGB BLD-MCNC: 13.9 G/DL (ref 11.5–15.4)
HGB UR QL STRIP.AUTO: NEGATIVE
INR PPP: 1.67 (ref 0.86–1.16)
KETONES UR STRIP-MCNC: ABNORMAL MG/DL
LACTATE SERPL-SCNC: 1.7 MMOL/L (ref 0.5–2)
LACTATE SERPL-SCNC: 2.1 MMOL/L (ref 0.5–2)
LACTATE SERPL-SCNC: 2.3 MMOL/L (ref 0.5–2)
LEUKOCYTE ESTERASE UR QL STRIP: ABNORMAL
LYMPHOCYTES # BLD AUTO: 0.35 THOUSAND/UL (ref 0.6–4.47)
LYMPHOCYTES # BLD AUTO: 2 % (ref 14–44)
MCH RBC QN AUTO: 30.2 PG (ref 26.8–34.3)
MCHC RBC AUTO-ENTMCNC: 32.9 G/DL (ref 31.4–37.4)
MCV RBC AUTO: 92 FL (ref 82–98)
MONOCYTES # BLD AUTO: 1.04 THOUSAND/UL (ref 0–1.22)
MONOCYTES NFR BLD: 6 % (ref 4–12)
NEUTROPHILS # BLD MANUAL: 15.79 THOUSAND/UL (ref 1.85–7.62)
NEUTS BAND NFR BLD MANUAL: 4 % (ref 0–8)
NEUTS SEG NFR BLD AUTO: 87 % (ref 43–75)
NITRITE UR QL STRIP: POSITIVE
NON-SQ EPI CELLS URNS QL MICRO: ABNORMAL /HPF
NT-PROBNP SERPL-MCNC: 3760 PG/ML
OTHER STN SPEC: ABNORMAL
PH UR STRIP.AUTO: 6 [PH] (ref 4.5–8)
PLATELET # BLD AUTO: 320 THOUSANDS/UL (ref 149–390)
PLATELET BLD QL SMEAR: ADEQUATE
PMV BLD AUTO: 9.6 FL (ref 8.9–12.7)
POTASSIUM SERPL-SCNC: 4.4 MMOL/L (ref 3.5–5.3)
PROT SERPL-MCNC: 6.2 G/DL (ref 6.4–8.2)
PROT UR STRIP-MCNC: NEGATIVE MG/DL
PROTHROMBIN TIME: 19.5 SECONDS (ref 12.1–14.4)
RBC # BLD AUTO: 4.6 MILLION/UL (ref 3.81–5.12)
RBC #/AREA URNS AUTO: ABNORMAL /HPF
RBC MORPH BLD: NORMAL
SODIUM SERPL-SCNC: 135 MMOL/L (ref 136–145)
SP GR UR STRIP.AUTO: 1.02 (ref 1–1.03)
TOTAL CELLS COUNTED SPEC: 100
TROPONIN I SERPL-MCNC: <0.02 NG/ML
UROBILINOGEN UR QL STRIP.AUTO: 0.2 E.U./DL
VARIANT LYMPHS # BLD AUTO: 1 %
WBC # BLD AUTO: 17.35 THOUSAND/UL (ref 4.31–10.16)
WBC #/AREA URNS AUTO: ABNORMAL /HPF

## 2017-08-09 PROCEDURE — 85007 BL SMEAR W/DIFF WBC COUNT: CPT | Performed by: EMERGENCY MEDICINE

## 2017-08-09 PROCEDURE — 96365 THER/PROPH/DIAG IV INF INIT: CPT

## 2017-08-09 PROCEDURE — 36415 COLL VENOUS BLD VENIPUNCTURE: CPT | Performed by: EMERGENCY MEDICINE

## 2017-08-09 PROCEDURE — 83880 ASSAY OF NATRIURETIC PEPTIDE: CPT | Performed by: EMERGENCY MEDICINE

## 2017-08-09 PROCEDURE — 81001 URINALYSIS AUTO W/SCOPE: CPT | Performed by: EMERGENCY MEDICINE

## 2017-08-09 PROCEDURE — 94760 N-INVAS EAR/PLS OXIMETRY 1: CPT

## 2017-08-09 PROCEDURE — 87040 BLOOD CULTURE FOR BACTERIA: CPT | Performed by: EMERGENCY MEDICINE

## 2017-08-09 PROCEDURE — 83605 ASSAY OF LACTIC ACID: CPT | Performed by: FAMILY MEDICINE

## 2017-08-09 PROCEDURE — 85610 PROTHROMBIN TIME: CPT | Performed by: EMERGENCY MEDICINE

## 2017-08-09 PROCEDURE — 71010 HB CHEST X-RAY 1 VIEW FRONTAL (PORTABLE): CPT

## 2017-08-09 PROCEDURE — 87086 URINE CULTURE/COLONY COUNT: CPT | Performed by: EMERGENCY MEDICINE

## 2017-08-09 PROCEDURE — 80053 COMPREHEN METABOLIC PANEL: CPT | Performed by: EMERGENCY MEDICINE

## 2017-08-09 PROCEDURE — 85730 THROMBOPLASTIN TIME PARTIAL: CPT | Performed by: EMERGENCY MEDICINE

## 2017-08-09 PROCEDURE — 85027 COMPLETE CBC AUTOMATED: CPT | Performed by: EMERGENCY MEDICINE

## 2017-08-09 PROCEDURE — 99285 EMERGENCY DEPT VISIT HI MDM: CPT

## 2017-08-09 PROCEDURE — 93005 ELECTROCARDIOGRAM TRACING: CPT | Performed by: EMERGENCY MEDICINE

## 2017-08-09 PROCEDURE — 84484 ASSAY OF TROPONIN QUANT: CPT | Performed by: EMERGENCY MEDICINE

## 2017-08-09 PROCEDURE — 94640 AIRWAY INHALATION TREATMENT: CPT

## 2017-08-09 PROCEDURE — 83605 ASSAY OF LACTIC ACID: CPT | Performed by: EMERGENCY MEDICINE

## 2017-08-09 RX ORDER — GUAIFENESIN 100 MG/5ML
200 SOLUTION ORAL EVERY 4 HOURS PRN
Status: DISCONTINUED | OUTPATIENT
Start: 2017-08-09 | End: 2017-08-11 | Stop reason: HOSPADM

## 2017-08-09 RX ORDER — METOPROLOL SUCCINATE 50 MG/1
50 TABLET, EXTENDED RELEASE ORAL 2 TIMES DAILY
Status: DISCONTINUED | OUTPATIENT
Start: 2017-08-09 | End: 2017-08-11 | Stop reason: HOSPADM

## 2017-08-09 RX ORDER — ALBUTEROL SULFATE 2.5 MG/3ML
2.5 SOLUTION RESPIRATORY (INHALATION) EVERY 4 HOURS PRN
Status: DISCONTINUED | OUTPATIENT
Start: 2017-08-09 | End: 2017-08-09

## 2017-08-09 RX ORDER — FENTANYL 50 UG/H
1 PATCH TRANSDERMAL
Status: DISCONTINUED | OUTPATIENT
Start: 2017-08-09 | End: 2017-08-09

## 2017-08-09 RX ORDER — FLUTICASONE PROPIONATE 50 MCG
2 SPRAY, SUSPENSION (ML) NASAL DAILY
Status: DISCONTINUED | OUTPATIENT
Start: 2017-08-09 | End: 2017-08-11 | Stop reason: HOSPADM

## 2017-08-09 RX ORDER — ALBUTEROL SULFATE 2.5 MG/3ML
2.5 SOLUTION RESPIRATORY (INHALATION) EVERY 6 HOURS PRN
Status: DISCONTINUED | OUTPATIENT
Start: 2017-08-09 | End: 2017-08-09

## 2017-08-09 RX ORDER — MONTELUKAST SODIUM 10 MG/1
10 TABLET ORAL
Status: DISCONTINUED | OUTPATIENT
Start: 2017-08-09 | End: 2017-08-11 | Stop reason: HOSPADM

## 2017-08-09 RX ORDER — BENZONATATE 100 MG/1
100 CAPSULE ORAL 3 TIMES DAILY PRN
Status: DISCONTINUED | OUTPATIENT
Start: 2017-08-09 | End: 2017-08-11 | Stop reason: HOSPADM

## 2017-08-09 RX ORDER — ACETAMINOPHEN 325 MG/1
650 TABLET ORAL EVERY 6 HOURS PRN
Status: DISCONTINUED | OUTPATIENT
Start: 2017-08-09 | End: 2017-08-11 | Stop reason: HOSPADM

## 2017-08-09 RX ORDER — ALBUTEROL SULFATE 2.5 MG/3ML
2.5 SOLUTION RESPIRATORY (INHALATION)
Status: DISCONTINUED | OUTPATIENT
Start: 2017-08-09 | End: 2017-08-11 | Stop reason: HOSPADM

## 2017-08-09 RX ORDER — ONDANSETRON 2 MG/ML
4 INJECTION INTRAMUSCULAR; INTRAVENOUS EVERY 6 HOURS PRN
Status: DISCONTINUED | OUTPATIENT
Start: 2017-08-09 | End: 2017-08-11 | Stop reason: HOSPADM

## 2017-08-09 RX ORDER — FENTANYL 50 UG/H
1 PATCH TRANSDERMAL
Status: DISCONTINUED | OUTPATIENT
Start: 2017-08-10 | End: 2017-08-09

## 2017-08-09 RX ORDER — TOLTERODINE TARTRATE 1 MG/1
2 TABLET, EXTENDED RELEASE ORAL 2 TIMES DAILY
Status: DISCONTINUED | OUTPATIENT
Start: 2017-08-09 | End: 2017-08-11 | Stop reason: HOSPADM

## 2017-08-09 RX ORDER — LORAZEPAM 0.5 MG/1
0.5 TABLET ORAL 2 TIMES DAILY PRN
Status: DISCONTINUED | OUTPATIENT
Start: 2017-08-09 | End: 2017-08-11 | Stop reason: HOSPADM

## 2017-08-09 RX ORDER — ALBUTEROL SULFATE 2.5 MG/3ML
2.5 SOLUTION RESPIRATORY (INHALATION) EVERY 6 HOURS PRN
Status: DISCONTINUED | OUTPATIENT
Start: 2017-08-09 | End: 2017-08-11 | Stop reason: HOSPADM

## 2017-08-09 RX ORDER — FENTANYL 50 UG/H
1 PATCH TRANSDERMAL
Status: DISCONTINUED | OUTPATIENT
Start: 2017-08-10 | End: 2017-08-11 | Stop reason: HOSPADM

## 2017-08-09 RX ORDER — ACETAMINOPHEN 325 MG/1
650 TABLET ORAL ONCE
Status: COMPLETED | OUTPATIENT
Start: 2017-08-09 | End: 2017-08-09

## 2017-08-09 RX ORDER — MAGNESIUM HYDROXIDE/ALUMINUM HYDROXICE/SIMETHICONE 120; 1200; 1200 MG/30ML; MG/30ML; MG/30ML
30 SUSPENSION ORAL EVERY 6 HOURS PRN
Status: DISCONTINUED | OUTPATIENT
Start: 2017-08-09 | End: 2017-08-11 | Stop reason: HOSPADM

## 2017-08-09 RX ORDER — DIGOXIN 125 MCG
125 TABLET ORAL DAILY
Status: DISCONTINUED | OUTPATIENT
Start: 2017-08-09 | End: 2017-08-11 | Stop reason: HOSPADM

## 2017-08-09 RX ORDER — SODIUM CHLORIDE 9 MG/ML
50 INJECTION, SOLUTION INTRAVENOUS CONTINUOUS
Status: DISCONTINUED | OUTPATIENT
Start: 2017-08-09 | End: 2017-08-11

## 2017-08-09 RX ORDER — ACETAMINOPHEN 325 MG/1
TABLET ORAL
Status: COMPLETED
Start: 2017-08-09 | End: 2017-08-09

## 2017-08-09 RX ORDER — HALOPERIDOL 5 MG/ML
2 INJECTION INTRAMUSCULAR EVERY 6 HOURS PRN
Status: DISCONTINUED | OUTPATIENT
Start: 2017-08-09 | End: 2017-08-11 | Stop reason: HOSPADM

## 2017-08-09 RX ORDER — AMOXICILLIN 875 MG/1
875 TABLET, COATED ORAL 2 TIMES DAILY
COMMUNITY
End: 2017-08-11 | Stop reason: HOSPADM

## 2017-08-09 RX ADMIN — AZITHROMYCIN MONOHYDRATE 500 MG: 500 INJECTION, POWDER, LYOPHILIZED, FOR SOLUTION INTRAVENOUS at 12:54

## 2017-08-09 RX ADMIN — TOLTERODINE TARTRATE 2 MG: 1 TABLET, FILM COATED ORAL at 17:59

## 2017-08-09 RX ADMIN — FLUTICASONE PROPIONATE 2 SPRAY: 50 SPRAY, METERED NASAL at 16:25

## 2017-08-09 RX ADMIN — SODIUM CHLORIDE 1300 ML: 0.9 INJECTION, SOLUTION INTRAVENOUS at 11:27

## 2017-08-09 RX ADMIN — BENZONATATE 100 MG: 100 CAPSULE ORAL at 17:58

## 2017-08-09 RX ADMIN — DIGOXIN 125 MCG: 125 TABLET ORAL at 15:24

## 2017-08-09 RX ADMIN — APIXABAN 2.5 MG: 2.5 TABLET, FILM COATED ORAL at 17:58

## 2017-08-09 RX ADMIN — ALBUTEROL SULFATE 2.5 MG: 2.5 SOLUTION RESPIRATORY (INHALATION) at 20:41

## 2017-08-09 RX ADMIN — MONTELUKAST SODIUM 10 MG: 10 TABLET, FILM COATED ORAL at 21:31

## 2017-08-09 RX ADMIN — ACETAMINOPHEN 650 MG: 325 TABLET ORAL at 13:16

## 2017-08-09 RX ADMIN — FLUTICASONE PROPIONATE AND SALMETEROL 1 PUFF: 50; 250 POWDER RESPIRATORY (INHALATION) at 20:41

## 2017-08-09 RX ADMIN — ALBUTEROL SULFATE 2.5 MG: 2.5 SOLUTION RESPIRATORY (INHALATION) at 14:39

## 2017-08-09 RX ADMIN — SODIUM CHLORIDE 50 ML/HR: 0.9 INJECTION, SOLUTION INTRAVENOUS at 15:30

## 2017-08-09 RX ADMIN — CEFTRIAXONE SODIUM 1000 MG: 1 INJECTION, POWDER, FOR SOLUTION INTRAMUSCULAR; INTRAVENOUS at 11:57

## 2017-08-09 RX ADMIN — FLUTICASONE PROPIONATE AND SALMETEROL 1 PUFF: 50; 250 POWDER RESPIRATORY (INHALATION) at 14:39

## 2017-08-09 RX ADMIN — GUAIFENESIN 200 MG: 100 SOLUTION ORAL at 17:58

## 2017-08-09 RX ADMIN — METOPROLOL SUCCINATE 50 MG: 50 TABLET, EXTENDED RELEASE ORAL at 17:58

## 2017-08-10 ENCOUNTER — APPOINTMENT (INPATIENT)
Dept: PHYSICAL THERAPY | Facility: HOSPITAL | Age: 82
DRG: 871 | End: 2017-08-10
Payer: MEDICARE

## 2017-08-10 LAB
ANION GAP SERPL CALCULATED.3IONS-SCNC: 9 MMOL/L (ref 4–13)
ATRIAL RATE: 104 BPM
BACTERIA UR CULT: NORMAL
BASOPHILS # BLD MANUAL: 0 THOUSAND/UL (ref 0–0.1)
BASOPHILS NFR MAR MANUAL: 0 % (ref 0–1)
BUN SERPL-MCNC: 14 MG/DL (ref 5–25)
CALCIUM SERPL-MCNC: 7.4 MG/DL (ref 8.3–10.1)
CHLORIDE SERPL-SCNC: 100 MMOL/L (ref 100–108)
CO2 SERPL-SCNC: 27 MMOL/L (ref 21–32)
CREAT SERPL-MCNC: 0.84 MG/DL (ref 0.6–1.3)
EOSINOPHIL # BLD MANUAL: 0 THOUSAND/UL (ref 0–0.4)
EOSINOPHIL NFR BLD MANUAL: 0 % (ref 0–6)
ERYTHROCYTE [DISTWIDTH] IN BLOOD BY AUTOMATED COUNT: 14.2 % (ref 11.6–15.1)
GFR SERPL CREATININE-BSD FRML MDRD: 64 ML/MIN/1.73SQ M
GLUCOSE SERPL-MCNC: 108 MG/DL (ref 65–140)
HCT VFR BLD AUTO: 35.2 % (ref 34.8–46.1)
HGB BLD-MCNC: 11.1 G/DL (ref 11.5–15.4)
L PNEUMO1 AG UR QL IA.RAPID: NEGATIVE
LYMPHOCYTES # BLD AUTO: 0.4 THOUSAND/UL (ref 0.6–4.47)
LYMPHOCYTES # BLD AUTO: 3 % (ref 14–44)
MCH RBC QN AUTO: 29.1 PG (ref 26.8–34.3)
MCHC RBC AUTO-ENTMCNC: 31.5 G/DL (ref 31.4–37.4)
MCV RBC AUTO: 92 FL (ref 82–98)
MONOCYTES # BLD AUTO: 0.26 THOUSAND/UL (ref 0–1.22)
MONOCYTES NFR BLD: 2 % (ref 4–12)
NEUTROPHILS # BLD MANUAL: 12.52 THOUSAND/UL (ref 1.85–7.62)
NEUTS SEG NFR BLD AUTO: 95 % (ref 43–75)
PLATELET # BLD AUTO: 242 THOUSANDS/UL (ref 149–390)
PLATELET BLD QL SMEAR: ADEQUATE
PMV BLD AUTO: 9.7 FL (ref 8.9–12.7)
POTASSIUM SERPL-SCNC: 3.8 MMOL/L (ref 3.5–5.3)
QRS AXIS: -39 DEGREES
QRSD INTERVAL: 78 MS
QT INTERVAL: 298 MS
QTC INTERVAL: 386 MS
RBC # BLD AUTO: 3.82 MILLION/UL (ref 3.81–5.12)
RBC MORPH BLD: NORMAL
S PNEUM AG UR QL: NEGATIVE
SODIUM SERPL-SCNC: 136 MMOL/L (ref 136–145)
T WAVE AXIS: 118 DEGREES
TOTAL CELLS COUNTED SPEC: 100
VENTRICULAR RATE: 101 BPM
WBC # BLD AUTO: 13.18 THOUSAND/UL (ref 4.31–10.16)

## 2017-08-10 PROCEDURE — 94640 AIRWAY INHALATION TREATMENT: CPT

## 2017-08-10 PROCEDURE — 87205 SMEAR GRAM STAIN: CPT | Performed by: FAMILY MEDICINE

## 2017-08-10 PROCEDURE — 87449 NOS EACH ORGANISM AG IA: CPT | Performed by: FAMILY MEDICINE

## 2017-08-10 PROCEDURE — 85007 BL SMEAR W/DIFF WBC COUNT: CPT | Performed by: FAMILY MEDICINE

## 2017-08-10 PROCEDURE — 97163 PT EVAL HIGH COMPLEX 45 MIN: CPT

## 2017-08-10 PROCEDURE — G8978 MOBILITY CURRENT STATUS: HCPCS

## 2017-08-10 PROCEDURE — 85027 COMPLETE CBC AUTOMATED: CPT | Performed by: FAMILY MEDICINE

## 2017-08-10 PROCEDURE — 94760 N-INVAS EAR/PLS OXIMETRY 1: CPT

## 2017-08-10 PROCEDURE — 80048 BASIC METABOLIC PNL TOTAL CA: CPT | Performed by: FAMILY MEDICINE

## 2017-08-10 PROCEDURE — G8979 MOBILITY GOAL STATUS: HCPCS

## 2017-08-10 RX ADMIN — BENZONATATE 100 MG: 100 CAPSULE ORAL at 17:29

## 2017-08-10 RX ADMIN — ALBUTEROL SULFATE 2.5 MG: 2.5 SOLUTION RESPIRATORY (INHALATION) at 21:19

## 2017-08-10 RX ADMIN — GUAIFENESIN 200 MG: 100 SOLUTION ORAL at 21:38

## 2017-08-10 RX ADMIN — TOLTERODINE TARTRATE 2 MG: 1 TABLET, FILM COATED ORAL at 12:10

## 2017-08-10 RX ADMIN — MONTELUKAST SODIUM 10 MG: 10 TABLET, FILM COATED ORAL at 21:38

## 2017-08-10 RX ADMIN — BENZONATATE 100 MG: 100 CAPSULE ORAL at 13:15

## 2017-08-10 RX ADMIN — BENZONATATE 100 MG: 100 CAPSULE ORAL at 21:40

## 2017-08-10 RX ADMIN — GUAIFENESIN 200 MG: 100 SOLUTION ORAL at 13:15

## 2017-08-10 RX ADMIN — TOLTERODINE TARTRATE 2 MG: 1 TABLET, FILM COATED ORAL at 21:36

## 2017-08-10 RX ADMIN — AZITHROMYCIN FOR INJECTION INJECTION, POWDER, LYOPHILIZED, FOR SOLUTION 500 MG: 500 INJECTION INTRAVENOUS at 13:15

## 2017-08-10 RX ADMIN — GUAIFENESIN 200 MG: 100 SOLUTION ORAL at 17:29

## 2017-08-10 RX ADMIN — APIXABAN 2.5 MG: 2.5 TABLET, FILM COATED ORAL at 17:29

## 2017-08-10 RX ADMIN — METOPROLOL SUCCINATE 50 MG: 50 TABLET, EXTENDED RELEASE ORAL at 09:46

## 2017-08-10 RX ADMIN — FENTANYL 1 PATCH: 50 PATCH, EXTENDED RELEASE TRANSDERMAL at 09:46

## 2017-08-10 RX ADMIN — LORAZEPAM 0.5 MG: 0.5 TABLET ORAL at 21:38

## 2017-08-10 RX ADMIN — SODIUM CHLORIDE 50 ML/HR: 0.9 INJECTION, SOLUTION INTRAVENOUS at 17:21

## 2017-08-10 RX ADMIN — APIXABAN 2.5 MG: 2.5 TABLET, FILM COATED ORAL at 09:46

## 2017-08-10 RX ADMIN — METOPROLOL SUCCINATE 50 MG: 50 TABLET, EXTENDED RELEASE ORAL at 17:29

## 2017-08-10 RX ADMIN — FLUTICASONE PROPIONATE AND SALMETEROL 1 PUFF: 50; 250 POWDER RESPIRATORY (INHALATION) at 08:08

## 2017-08-10 RX ADMIN — FLUTICASONE PROPIONATE AND SALMETEROL 1 PUFF: 50; 250 POWDER RESPIRATORY (INHALATION) at 21:19

## 2017-08-10 RX ADMIN — FLUTICASONE PROPIONATE 2 SPRAY: 50 SPRAY, METERED NASAL at 09:46

## 2017-08-10 RX ADMIN — ALBUTEROL SULFATE 2.5 MG: 2.5 SOLUTION RESPIRATORY (INHALATION) at 08:10

## 2017-08-10 RX ADMIN — CEFTRIAXONE SODIUM 1000 MG: 1 INJECTION, POWDER, FOR SOLUTION INTRAMUSCULAR; INTRAVENOUS at 12:06

## 2017-08-10 RX ADMIN — DIGOXIN 125 MCG: 125 TABLET ORAL at 09:46

## 2017-08-11 ENCOUNTER — APPOINTMENT (INPATIENT)
Dept: RADIOLOGY | Facility: HOSPITAL | Age: 82
DRG: 871 | End: 2017-08-11
Payer: MEDICARE

## 2017-08-11 VITALS
HEIGHT: 61 IN | OXYGEN SATURATION: 95 % | SYSTOLIC BLOOD PRESSURE: 130 MMHG | BODY MASS INDEX: 19.15 KG/M2 | HEART RATE: 86 BPM | DIASTOLIC BLOOD PRESSURE: 61 MMHG | TEMPERATURE: 98.3 F | WEIGHT: 101.41 LBS | RESPIRATION RATE: 18 BRPM

## 2017-08-11 LAB
ANION GAP SERPL CALCULATED.3IONS-SCNC: 9 MMOL/L (ref 4–13)
BACTERIA SPT RESP CULT: NORMAL
BASOPHILS # BLD MANUAL: 0 THOUSAND/UL (ref 0–0.1)
BASOPHILS NFR MAR MANUAL: 0 % (ref 0–1)
BUN SERPL-MCNC: 13 MG/DL (ref 5–25)
CALCIUM SERPL-MCNC: 7.6 MG/DL (ref 8.3–10.1)
CHLORIDE SERPL-SCNC: 103 MMOL/L (ref 100–108)
CO2 SERPL-SCNC: 25 MMOL/L (ref 21–32)
CREAT SERPL-MCNC: 0.64 MG/DL (ref 0.6–1.3)
EOSINOPHIL # BLD MANUAL: 0 THOUSAND/UL (ref 0–0.4)
EOSINOPHIL NFR BLD MANUAL: 0 % (ref 0–6)
ERYTHROCYTE [DISTWIDTH] IN BLOOD BY AUTOMATED COUNT: 14 % (ref 11.6–15.1)
GFR SERPL CREATININE-BSD FRML MDRD: 82 ML/MIN/1.73SQ M
GLUCOSE SERPL-MCNC: 112 MG/DL (ref 65–140)
GRAM STN SPEC: NORMAL
HCT VFR BLD AUTO: 35.2 % (ref 34.8–46.1)
HGB BLD-MCNC: 11.3 G/DL (ref 11.5–15.4)
LYMPHOCYTES # BLD AUTO: 0.24 THOUSAND/UL (ref 0.6–4.47)
LYMPHOCYTES # BLD AUTO: 2 % (ref 14–44)
MCH RBC QN AUTO: 29.7 PG (ref 26.8–34.3)
MCHC RBC AUTO-ENTMCNC: 32.1 G/DL (ref 31.4–37.4)
MCV RBC AUTO: 92 FL (ref 82–98)
MONOCYTES # BLD AUTO: 0.35 THOUSAND/UL (ref 0–1.22)
MONOCYTES NFR BLD: 3 % (ref 4–12)
NEUTROPHILS # BLD MANUAL: 11.18 THOUSAND/UL (ref 1.85–7.62)
NEUTS SEG NFR BLD AUTO: 95 % (ref 43–75)
PLATELET # BLD AUTO: 227 THOUSANDS/UL (ref 149–390)
PLATELET BLD QL SMEAR: ADEQUATE
PMV BLD AUTO: 9 FL (ref 8.9–12.7)
POTASSIUM SERPL-SCNC: 3.3 MMOL/L (ref 3.5–5.3)
RBC # BLD AUTO: 3.81 MILLION/UL (ref 3.81–5.12)
RBC MORPH BLD: NORMAL
SODIUM SERPL-SCNC: 137 MMOL/L (ref 136–145)
TOTAL CELLS COUNTED SPEC: 100
WBC # BLD AUTO: 11.77 THOUSAND/UL (ref 4.31–10.16)

## 2017-08-11 PROCEDURE — 85007 BL SMEAR W/DIFF WBC COUNT: CPT | Performed by: FAMILY MEDICINE

## 2017-08-11 PROCEDURE — 94760 N-INVAS EAR/PLS OXIMETRY 1: CPT

## 2017-08-11 PROCEDURE — 94640 AIRWAY INHALATION TREATMENT: CPT

## 2017-08-11 PROCEDURE — 92610 EVALUATE SWALLOWING FUNCTION: CPT

## 2017-08-11 PROCEDURE — 71010 HB CHEST X-RAY 1 VIEW FRONTAL (PORTABLE): CPT

## 2017-08-11 PROCEDURE — 85027 COMPLETE CBC AUTOMATED: CPT | Performed by: FAMILY MEDICINE

## 2017-08-11 PROCEDURE — 80048 BASIC METABOLIC PNL TOTAL CA: CPT | Performed by: FAMILY MEDICINE

## 2017-08-11 RX ORDER — POTASSIUM CHLORIDE 20 MEQ/1
40 TABLET, EXTENDED RELEASE ORAL ONCE
Status: COMPLETED | OUTPATIENT
Start: 2017-08-11 | End: 2017-08-11

## 2017-08-11 RX ORDER — CEPHALEXIN 500 MG/1
500 CAPSULE ORAL EVERY 12 HOURS SCHEDULED
Qty: 14 CAPSULE | Refills: 0 | Status: SHIPPED | OUTPATIENT
Start: 2017-08-11 | End: 2017-08-18

## 2017-08-11 RX ADMIN — TOLTERODINE TARTRATE 2 MG: 1 TABLET, FILM COATED ORAL at 09:19

## 2017-08-11 RX ADMIN — FLUTICASONE PROPIONATE 2 SPRAY: 50 SPRAY, METERED NASAL at 09:20

## 2017-08-11 RX ADMIN — APIXABAN 2.5 MG: 2.5 TABLET, FILM COATED ORAL at 09:19

## 2017-08-11 RX ADMIN — DIGOXIN 125 MCG: 125 TABLET ORAL at 09:19

## 2017-08-11 RX ADMIN — FLUTICASONE PROPIONATE AND SALMETEROL 1 PUFF: 50; 250 POWDER RESPIRATORY (INHALATION) at 08:36

## 2017-08-11 RX ADMIN — ALBUTEROL SULFATE 2.5 MG: 2.5 SOLUTION RESPIRATORY (INHALATION) at 08:37

## 2017-08-11 RX ADMIN — METOPROLOL SUCCINATE 50 MG: 50 TABLET, EXTENDED RELEASE ORAL at 09:20

## 2017-08-11 RX ADMIN — POTASSIUM CHLORIDE 40 MEQ: 1500 TABLET, EXTENDED RELEASE ORAL at 12:04

## 2017-08-14 LAB
BACTERIA BLD CULT: NORMAL
BACTERIA BLD CULT: NORMAL

## 2017-08-15 ENCOUNTER — ALLSCRIPTS OFFICE VISIT (OUTPATIENT)
Dept: OTHER | Facility: OTHER | Age: 82
End: 2017-08-15

## 2017-08-18 ENCOUNTER — ALLSCRIPTS OFFICE VISIT (OUTPATIENT)
Dept: OTHER | Facility: OTHER | Age: 82
End: 2017-08-18

## 2017-08-31 ENCOUNTER — GENERIC CONVERSION - ENCOUNTER (OUTPATIENT)
Dept: OTHER | Facility: OTHER | Age: 82
End: 2017-08-31

## 2017-09-07 DIAGNOSIS — J18.9 PNEUMONIA: ICD-10-CM

## 2017-09-19 ENCOUNTER — HOSPITAL ENCOUNTER (EMERGENCY)
Facility: HOSPITAL | Age: 82
Discharge: HOME | End: 2017-09-19
Attending: EMERGENCY MEDICINE | Admitting: EMERGENCY MEDICINE
Payer: MEDICARE

## 2017-09-19 ENCOUNTER — APPOINTMENT (EMERGENCY)
Dept: RADIOLOGY | Facility: HOSPITAL | Age: 82
End: 2017-09-19
Payer: MEDICARE

## 2017-09-19 VITALS
HEIGHT: 60 IN | DIASTOLIC BLOOD PRESSURE: 91 MMHG | OXYGEN SATURATION: 96 % | BODY MASS INDEX: 17.87 KG/M2 | RESPIRATION RATE: 20 BRPM | HEART RATE: 86 BPM | WEIGHT: 91 LBS | TEMPERATURE: 97 F | SYSTOLIC BLOOD PRESSURE: 127 MMHG

## 2017-09-19 DIAGNOSIS — S83.90XA KNEE SPRAIN: ICD-10-CM
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
## 2017-09-19 DIAGNOSIS — S09.90XA HEAD INJURY, INITIAL ENCOUNTER: ICD-10-CM

## 2017-09-19 PROCEDURE — 96374 THER/PROPH/DIAG INJ IV PUSH: CPT

## 2017-09-19 PROCEDURE — 99283 EMERGENCY DEPT VISIT LOW MDM: CPT

## 2017-09-19 PROCEDURE — 73564 X-RAY EXAM KNEE 4 OR MORE: CPT

## 2017-09-19 RX ADMIN — HYDROMORPHONE HYDROCHLORIDE 0.5 MG: 1 INJECTION, SOLUTION INTRAMUSCULAR; INTRAVENOUS; SUBCUTANEOUS at 10:06

## 2017-09-20 ENCOUNTER — TELEPHONE (OUTPATIENT)
Dept: EMERGENCY DEPT | Facility: HOSPITAL | Age: 82
End: 2017-09-20

## 2017-10-31 ENCOUNTER — HOSPITAL ENCOUNTER (EMERGENCY)
Facility: HOSPITAL | Age: 82
Discharge: LEFT BEFORE DISCHARGE | End: 2017-11-01
Attending: EMERGENCY MEDICINE
Payer: MEDICARE

## 2017-10-31 ENCOUNTER — APPOINTMENT (EMERGENCY)
Dept: CT IMAGING | Facility: HOSPITAL | Age: 82
End: 2017-10-31
Payer: MEDICARE

## 2017-10-31 VITALS
OXYGEN SATURATION: 99 % | TEMPERATURE: 99.1 F | SYSTOLIC BLOOD PRESSURE: 154 MMHG | DIASTOLIC BLOOD PRESSURE: 86 MMHG | RESPIRATION RATE: 18 BRPM | HEART RATE: 88 BPM | HEIGHT: 60 IN | WEIGHT: 90 LBS | BODY MASS INDEX: 17.67 KG/M2

## 2017-10-31 DIAGNOSIS — K59.00 CONSTIPATION: Primary | ICD-10-CM

## 2017-10-31 LAB
ANION GAP SERPL CALCULATED.3IONS-SCNC: 7 MMOL/L (ref 4–13)
BASOPHILS # BLD AUTO: 0.03 THOUSANDS/ΜL (ref 0–0.1)
BASOPHILS NFR BLD AUTO: 0 % (ref 0–1)
BUN SERPL-MCNC: 14 MG/DL (ref 5–25)
CALCIUM SERPL-MCNC: 8.1 MG/DL (ref 8.3–10.1)
CHLORIDE SERPL-SCNC: 93 MMOL/L (ref 100–108)
CO2 SERPL-SCNC: 32 MMOL/L (ref 21–32)
CREAT SERPL-MCNC: 0.78 MG/DL (ref 0.6–1.3)
EOSINOPHIL # BLD AUTO: 0.01 THOUSAND/ΜL (ref 0–0.61)
EOSINOPHIL NFR BLD AUTO: 0 % (ref 0–6)
ERYTHROCYTE [DISTWIDTH] IN BLOOD BY AUTOMATED COUNT: 14.9 % (ref 11.6–15.1)
GFR SERPL CREATININE-BSD FRML MDRD: 70 ML/MIN/1.73SQ M
GLUCOSE SERPL-MCNC: 95 MG/DL (ref 65–140)
HCT VFR BLD AUTO: 32 % (ref 34.8–46.1)
HGB BLD-MCNC: 9.7 G/DL (ref 11.5–15.4)
LYMPHOCYTES # BLD AUTO: 0.87 THOUSANDS/ΜL (ref 0.6–4.47)
LYMPHOCYTES NFR BLD AUTO: 8 % (ref 14–44)
MCH RBC QN AUTO: 26.6 PG (ref 26.8–34.3)
MCHC RBC AUTO-ENTMCNC: 30.3 G/DL (ref 31.4–37.4)
MCV RBC AUTO: 88 FL (ref 82–98)
MONOCYTES # BLD AUTO: 0.86 THOUSAND/ΜL (ref 0.17–1.22)
MONOCYTES NFR BLD AUTO: 8 % (ref 4–12)
NEUTROPHILS # BLD AUTO: 8.81 THOUSANDS/ΜL (ref 1.85–7.62)
NEUTS SEG NFR BLD AUTO: 84 % (ref 43–75)
PLATELET # BLD AUTO: 482 THOUSANDS/UL (ref 149–390)
PMV BLD AUTO: 9.2 FL (ref 8.9–12.7)
POTASSIUM SERPL-SCNC: 3.6 MMOL/L (ref 3.5–5.3)
RBC # BLD AUTO: 3.65 MILLION/UL (ref 3.81–5.12)
SODIUM SERPL-SCNC: 132 MMOL/L (ref 136–145)
WBC # BLD AUTO: 10.58 THOUSAND/UL (ref 4.31–10.16)

## 2017-10-31 PROCEDURE — 80048 BASIC METABOLIC PNL TOTAL CA: CPT | Performed by: PHYSICIAN ASSISTANT

## 2017-10-31 PROCEDURE — 36415 COLL VENOUS BLD VENIPUNCTURE: CPT | Performed by: PHYSICIAN ASSISTANT

## 2017-10-31 PROCEDURE — 85025 COMPLETE CBC W/AUTO DIFF WBC: CPT | Performed by: PHYSICIAN ASSISTANT

## 2017-10-31 PROCEDURE — 74177 CT ABD & PELVIS W/CONTRAST: CPT

## 2017-10-31 PROCEDURE — 96374 THER/PROPH/DIAG INJ IV PUSH: CPT

## 2017-10-31 PROCEDURE — 96361 HYDRATE IV INFUSION ADD-ON: CPT

## 2017-10-31 RX ORDER — ONDANSETRON 4 MG/1
4 TABLET, ORALLY DISINTEGRATING ORAL ONCE
Status: DISCONTINUED | OUTPATIENT
Start: 2017-10-31 | End: 2017-10-31

## 2017-10-31 RX ORDER — OXYCODONE HYDROCHLORIDE AND ACETAMINOPHEN 5; 325 MG/1; MG/1
1 TABLET ORAL EVERY 4 HOURS PRN
COMMUNITY

## 2017-10-31 RX ORDER — ONDANSETRON 2 MG/ML
4 INJECTION INTRAMUSCULAR; INTRAVENOUS ONCE
Status: COMPLETED | OUTPATIENT
Start: 2017-10-31 | End: 2017-10-31

## 2017-10-31 RX ADMIN — ONDANSETRON 4 MG: 2 INJECTION INTRAMUSCULAR; INTRAVENOUS at 21:52

## 2017-10-31 RX ADMIN — SODIUM CHLORIDE 1000 ML: 0.9 INJECTION, SOLUTION INTRAVENOUS at 21:45

## 2017-10-31 RX ADMIN — IOHEXOL 100 ML: 350 INJECTION, SOLUTION INTRAVENOUS at 23:44

## 2017-11-01 PROCEDURE — 99284 EMERGENCY DEPT VISIT MOD MDM: CPT

## 2017-11-01 RX ORDER — OXYCODONE HYDROCHLORIDE 5 MG/1
5 TABLET ORAL ONCE
Status: DISCONTINUED | OUTPATIENT
Start: 2017-11-01 | End: 2017-11-01 | Stop reason: HOSPADM

## 2017-11-01 RX ORDER — POLYETHYLENE GLYCOL 3350 17 G/17G
17 POWDER, FOR SOLUTION ORAL ONCE
Status: DISCONTINUED | OUTPATIENT
Start: 2017-11-01 | End: 2017-11-01 | Stop reason: HOSPADM

## 2017-11-01 NOTE — ED CARE HANDOFF
Emergency Department Sign Out Note        Sign out and transfer of care from [facility]  See Separate Emergency Department note  The patient, Tawana Sampson, was evaluated by the previous provider for  constipation  Workup Completed:   CT scan does not show any obstruction    ED Course / Workup Pending (followup):   will give enema in the emergency room                          ED Course      Procedures  MDM  CritCare Time      Disposition  Final diagnoses:   None     ED Disposition     None      Follow-up Information    None       Patient's Medications   Discharge Prescriptions    No medications on file     No discharge procedures on file         ED Provider  Electronically Signed by

## 2017-11-01 NOTE — ED PROVIDER NOTES
History  Chief Complaint   Patient presents with    Constipation     Patient presents with family due to constipation x2 weeks  Daughter states that she has no had a "full BM in 2 weeks but has passed little bits of stool " Patient stage 4 cancer patient taking opioid pain medication and take senna  Patient admits to slight generalized abdominal pain  Admits to loss of appetite  No n/v presently  81 yo female presents for evaluation of constipation  Patient has stage for metastatic cancer, with primary originally melanoma  She was receiving treatment for 2 years; however, when the cancer metastasized to the bone they were told there was nothing more for them to do  They are here now because she has not gone to the bathroom for over 2 weeks  Patient states that she feels like she has to go but she can not  She also reports a decreased appetite  Her daughters are concerned because she keeps loosing weight  They have a nurse come weekly; however, they do not have another appointment until next week  Per daughters, she has not been placed in palliative care or hospice  Pt states she has abdominal pain, localized to the left lower quadrant  Mild in severity  Per patient she is receiving narcotics from her oncologist  Last dose was this morning  Patient has increased her use of narcotics over the past month  Prior to Admission Medications   Prescriptions Last Dose Informant Patient Reported? Taking?    albuterol (2 5 mg/3 mL) 0 083 % nebulizer solution   Yes No   Sig: Take 2 5 mg by nebulization every 6 (six) hours as needed for wheezing   apixaban (ELIQUIS) 2 5 mg   No No   Sig: Take 1 tablet by mouth 2 (two) times a day for 30 days   digoxin (LANOXIN) 0 125 mg tablet   No No   Sig: Take 1 tablet by mouth daily for 30 days   fentaNYL (DURAGESIC) 50 mcg/hr   Yes No   Sig: Place 1 patch on the skin every third day   fluticasone (FLONASE) 50 mcg/act nasal spray   Yes No   Si sprays into each nostril daily     fluticasone-salmeterol (ADVAIR) 250-50 mcg/dose inhaler   Yes No   Sig: Inhale 1 puff every 12 (twelve) hours   furosemide (LASIX) 20 mg tablet   No No   Sig: Take 1 tablet by mouth daily for 30 days   Patient taking differently: Take 10 mg by mouth daily     metoprolol succinate (TOPROL-XL) 50 mg 24 hr tablet   No No   Sig: Take 1 tablet by mouth 2 (two) times a day for 30 days   montelukast (SINGULAIR) 10 mg tablet   Yes No   Sig: Take 10 mg by mouth daily at bedtime   nefazodone (SERZONE) 150 mg tablet   Yes No   Sig: Take 150 mg by mouth 2 (two) times a day   oxyCODONE-acetaminophen (PERCOCET) 5-325 mg per tablet   Yes Yes   Sig: Take 1 tablet by mouth every 4 (four) hours as needed for moderate pain   potassium chloride (K-DUR) 10 mEq tablet   Yes No   Sig: Take 20 mEq by mouth daily   tolterodine (DETROL) 2 mg tablet   Yes No   Sig: Take 2 mg by mouth 2 (two) times a day      Facility-Administered Medications: None       Past Medical History:   Diagnosis Date    A-fib ([place] [address] )     Asthma     Cancer of spine ([place] [address] )     Chronic pain     HTN (hypertension)     Knee pain, chronic     r    Melanoma ([place] [address] )        Past Surgical History:   Procedure Laterality Date    ABDOMINAL SURGERY      CHOLECYSTECTOMY      HYSTERECTOMY      LEG SURGERY      TUMOR REMOVAL         Family History   Problem Relation Age of Onset    Cancer Mother     Cancer Father      I have reviewed and agree with the history as documented  Social History   Substance Use Topics    Smoking status: Never Smoker    Smokeless tobacco: Never Used    Alcohol use No        Review of Systems   Constitutional: Positive for appetite change  Negative for activity change, chills, diaphoresis, fatigue and fever  HENT: Negative for congestion, postnasal drip, rhinorrhea, sore throat, trouble swallowing and voice change  Eyes: Negative for photophobia and visual disturbance  Respiratory: Negative for cough and shortness of breath  Cardiovascular: Negative for chest pain  Gastrointestinal: Positive for abdominal pain and constipation  Negative for abdominal distention, anal bleeding, blood in stool, diarrhea, nausea and vomiting  Endocrine: Negative for cold intolerance and heat intolerance  Genitourinary: Negative for dysuria, flank pain, frequency, hematuria and urgency  Musculoskeletal: Negative for back pain, gait problem, neck pain and neck stiffness  Skin: Negative for color change, pallor, rash and wound  Allergic/Immunologic: Negative for food allergies  Neurological: Negative for dizziness, weakness, light-headedness, numbness and headaches  Hematological: Negative for adenopathy  Physical Exam  ED Triage Vitals [10/31/17 1907]   Temperature Pulse Respirations Blood Pressure SpO2   99 1 °F (37 3 °C) 90 17 134/76 99 %      Temp Source Heart Rate Source Patient Position - Orthostatic VS BP Location FiO2 (%)   Temporal Monitor Sitting Right arm --      Pain Score       5           Orthostatic Vital Signs  Vitals:    10/31/17 1907 10/31/17 2215 10/31/17 2230   BP: 134/76 163/74 154/86   Pulse: 90 88    Patient Position - Orthostatic VS: Sitting         Physical Exam   Constitutional: She is oriented to person, place, and time  She appears well-developed  No distress  Cachectic    HENT:   Head: Normocephalic and atraumatic  Eyes: Conjunctivae and EOM are normal  Pupils are equal, round, and reactive to light  Neck: Normal range of motion  Neck supple  Cardiovascular: Normal rate, regular rhythm, normal heart sounds and intact distal pulses  Exam reveals no gallop and no friction rub  No murmur heard  Pulmonary/Chest: Effort normal and breath sounds normal  No respiratory distress  She has no wheezes  She has no rales  She exhibits no tenderness  Abdominal: Soft  She exhibits no distension and no mass  There is tenderness  There is no rebound and no guarding  No hernia     Genitourinary:   Genitourinary Comments: There is a moderate amount of stool within the rectu  Musculoskeletal: Normal range of motion  She exhibits no edema, tenderness or deformity  Neurological: She is alert and oriented to person, place, and time  Skin: Skin is warm and dry  Capillary refill takes less than 2 seconds  She is not diaphoretic  No pallor  Psychiatric: She has a normal mood and affect  Her behavior is normal  Judgment and thought content normal    Vitals reviewed  ED Medications  Medications   sodium chloride 0 9 % bolus 1,000 mL (0 mL Intravenous Stopped 11/1/17 0115)   ondansetron (ZOFRAN) injection 4 mg (4 mg Intravenous Given 10/31/17 2152)   iohexol (OMNIPAQUE) 350 MG/ML injection (SINGLE-DOSE) 100 mL (100 mL Intravenous Given 10/31/17 2344)       Diagnostic Studies  Results Reviewed     Procedure Component Value Units Date/Time    Basic metabolic panel [[id number]]  (Abnormal) Collected:  10/31/17 2144    Lab Status:  Final result Specimen:  Blood from Arm, Left Updated:  10/31/17 2223     Sodium 132 (L) mmol/L      Potassium 3 6 mmol/L      Chloride 93 (L) mmol/L      CO2 32 mmol/L      Anion Gap 7 mmol/L      BUN 14 mg/dL      Creatinine 0 78 mg/dL      Glucose 95 mg/dL      Calcium 8 1 (L) mg/dL      eGFR 70 ml/min/1 73sq m     Narrative:         National Kidney Disease Education Program recommendations are as follows:  GFR calculation is accurate only with a steady state creatinine  Chronic Kidney disease less than 60 ml/min/1 73 sq  meters  Kidney failure less than 15 ml/min/1 73 sq  meters      CBC and differential [[id number]]  (Abnormal) Collected:  10/31/17 2144    Lab Status:  Final result Specimen:  Blood from Arm, Left Updated:  10/31/17 2213     WBC 10 58 (H) Thousand/uL      RBC 3 65 (L) Million/uL      Hemoglobin 9 7 (L) g/dL      Hematocrit 32 0 (L) %      MCV 88 fL      MCH 26 6 (L) pg      MCHC 30 3 (L) g/dL      RDW 14 9 %      MPV 9 2 fL      Platelets 262 (H) Thousands/uL      Neutrophils Relative 84 (H) %      Lymphocytes Relative 8 (L) %      Monocytes Relative 8 %      Eosinophils Relative 0 %      Basophils Relative 0 %      Neutrophils Absolute 8 81 (H) Thousands/µL      Lymphocytes Absolute 0 87 Thousands/µL      Monocytes Absolute 0 86 Thousand/µL      Eosinophils Absolute 0 01 Thousand/µL      Basophils Absolute 0 03 Thousands/µL                  RADIOLOGY RESULTS   Final Result by  (11/01 1429)      CT abdomen pelvis with contrast   ED Interpretation by Faustino Cope DO (11/01 0031)   [facility] read shows known metastatic  Lesions to the lung biliary dilation full bladder and constipation without any obstruction      Final Result by Izabela Lawson MD (11/01 0801)      1  Moderate fecal stasis  2   No acute inflammatory process in the abdomen or pelvis  3   Severe intrahepatic and extrahepatic biliary ductal dilatation without obstructing lesion visualized  4   Mild bilateral renal pelvic fullness likely related to marked bladder distention  5   Bibasilar pulmonary metastasis  Trace right pleural effusion  Findings are consistent with the preliminary report from [facility] which was provided shortly after completion of the exam                       Workstation performed: [id number]                    Procedures  Procedures       Phone Contacts  ED Phone Contact    ED Course  ED Course                                MDM  Number of Diagnoses or Management Options  Constipation:   Diagnosis management comments: Diff dx includes but is not limited to obstruction, constipation, narcotic induced    Action: labs, CT to r/o obstruction  Results: pending  ** Pt signed out to Dr Xochitl Newell at 22:00 on 10/31/17       Amount and/or Complexity of Data Reviewed  Clinical lab tests: ordered  Tests in the radiology section of CPT®: ordered  Discuss the patient with other providers: yes      CritCare Time    Disposition  Final diagnoses:   Constipation     Time reflects when diagnosis was documented in both MDM as applicable and the Disposition within this note     Time User Action Codes Description Comment    11/1/2017 12:34 AM Carmelina Jones Add [K59 00] Constipation       ED Disposition     ED Disposition Condition Comment    [address] discharge to home/self care      Condition at discharge: Stable        Follow-up Information     Follow up With Specialties Details Why [address], [address] In 1 day  [address] [address]  [phone]          Discharge Medication List as of 11/1/2017 12:35 AM      CONTINUE these medications which have NOT CHANGED    Details   oxyCODONE-acetaminophen (PERCOCET) 5-325 mg per tablet Take 1 tablet by mouth every 4 (four) hours as needed for moderate pain, Historical Med      albuterol (2 5 mg/3 mL) 0 083 % nebulizer solution Take 2 5 mg by nebulization every 6 (six) hours as needed for wheezing, Until Discontinued, Historical Med      apixaban (ELIQUIS) 2 5 mg Take 1 tablet by mouth 2 (two) times a day for 30 days, Starting Sun 1/15/2017, Until Tue 9/19/2017, Print      digoxin (LANOXIN) 0 125 mg tablet Take 1 tablet by mouth daily for 30 days, Starting Thu 3/9/2017, Until Tue 9/19/2017, Print      fentaNYL (DURAGESIC) 50 mcg/hr Place 1 patch on the skin every third day, Until Discontinued, Historical Med      fluticasone (FLONASE) 50 mcg/act nasal spray 2 sprays into each nostril daily  , Until Discontinued, Historical Med      fluticasone-salmeterol (ADVAIR) 250-50 mcg/dose inhaler Inhale 1 puff every 12 (twelve) hours, Until Discontinued, Historical Med      furosemide (LASIX) 20 mg tablet Take 1 tablet by mouth daily for 30 days, Starting Sun 1/15/2017, Until Tue 9/19/2017, Print      metoprolol succinate (TOPROL-XL) 50 mg 24 hr tablet Take 1 tablet by mouth 2 (two) times a day for 30 days, Starting Thu 3/9/2017, Until Tue 9/19/2017, Print      montelukast (SINGULAIR) 10 mg tablet Take 10 mg by mouth daily at bedtime, Until Discontinued, Historical Med      nefazodone (SERZONE) 150 mg tablet Take 150 mg by mouth 2 (two) times a day, Until Discontinued, Historical Med      potassium chloride (K-DUR) 10 mEq tablet Take 20 mEq by mouth daily, Until Discontinued, Historical Med      tolterodine (DETROL) 2 mg tablet Take 2 mg by mouth 2 (two) times a day, Until Discontinued, Historical Med           No discharge procedures on file      ED Provider  Electronically Signed by           Farhat Rico PA-C  11/01/17 1952

## 2017-11-01 NOTE — DISCHARGE INSTRUCTIONS
Constipation   WHAT YOU NEED TO KNOW:   Constipation is when you have hard, dry bowel movements, or you go longer than usual between bowel movements  DISCHARGE INSTRUCTIONS:   Return to the emergency department if:   · You have blood in your bowel movements  · You have a fever and abdominal pain with the constipation  Contact your healthcare provider if:   · Your constipation gets worse  · You start to vomit  · You have questions or concerns about your condition or care  Medicines:   · Medicine or a fiber supplement  may help make your bowel movement softer  A laxative may help relax and loosen your intestines to help you have a bowel movement  You may also be given medicine to increase fluid in your intestines  The fluid may help move bowel movements through your intestines  · Take your medicine as directed  Contact your healthcare provider if you think your medicine is not helping or if you have side effects  Tell him of her if you are allergic to any medicine  Keep a list of the medicines, vitamins, and herbs you take  Include the amounts, and when and why you take them  Bring the list or the pill bottles to follow-up visits  Carry your medicine list with you in case of an emergency  Manage your constipation:   · Drink liquids as directed  You may need to drink extra liquids to help soften and move your bowels  Ask how much liquid to drink each day and which liquids are best for you  · Eat high-fiber foods  This may help decrease constipation by adding bulk to your bowel movements  High-fiber foods include fruit, vegetables, whole-grain breads and cereals, and beans  Your healthcare provider or dietitian can help you create a high-fiber meal plan  · Exercise regularly  Regular physical activity can help stimulate your intestines  Ask which exercises are best for you  · Schedule a time each day to have a bowel movement    This may help train your body to have regular bowel movements  Bend forward while you are on the toilet to help move the bowel movement out  Sit on the toilet for at least 10 minutes, even if you do not have a bowel movement  Follow up with your healthcare provider as directed:  Write down your questions so you remember to ask them during your visits  © 2017 [address] [employer] Information is for End User's use only and may not be sold, redistributed or otherwise used for commercial purposes  All illustrations and images included in CareNotes® are the copyrighted property of [employer]  or [employer]  The above information is an  only  It is not intended as medical advice for individual conditions or treatments  Talk to your doctor, nurse or pharmacist before following any medical regimen to see if it is safe and effective for you

## 2017-11-01 NOTE — ED NOTES
While nurse was walking past pts room, pts family requested nurse took pts IV out because they were leaving "we're tired of waiting, take her IV out so we can go and take her home" this nurse apologized to family and attempt to address their needs but family stated "we dont care were leaving now" Dr Marian Pepe notified  AMA form printed along with pts discharge papers        Gaviota Carranza RN  11/01/17 4655

## 2017-11-01 NOTE — ED PROVIDER NOTES
History  Chief Complaint   Patient presents with    Constipation     Patient presents with family due to constipation x2 weeks  Daughter states that she has no had a "full BM in 2 weeks but has passed little bits of stool " Patient stage 4 cancer patient taking opioid pain medication and take senna  Patient admits to slight generalized abdominal pain  Admits to loss of appetite  No n/v presently  Constipation       Prior to Admission Medications   Prescriptions Last Dose Informant Patient Reported? Taking?    albuterol (2 5 mg/3 mL) 0 083 % nebulizer solution   Yes No   Sig: Take 2 5 mg by nebulization every 6 (six) hours as needed for wheezing   apixaban (ELIQUIS) 2 5 mg   No No   Sig: Take 1 tablet by mouth 2 (two) times a day for 30 days   digoxin (LANOXIN) 0 125 mg tablet   No No   Sig: Take 1 tablet by mouth daily for 30 days   fentaNYL (DURAGESIC) 50 mcg/hr   Yes No   Sig: Place 1 patch on the skin every third day   fluticasone (FLONASE) 50 mcg/act nasal spray   Yes No   Si sprays into each nostril daily     fluticasone-salmeterol (ADVAIR) 250-50 mcg/dose inhaler   Yes No   Sig: Inhale 1 puff every 12 (twelve) hours   furosemide (LASIX) 20 mg tablet   No No   Sig: Take 1 tablet by mouth daily for 30 days   Patient taking differently: Take 10 mg by mouth daily     metoprolol succinate (TOPROL-XL) 50 mg 24 hr tablet   No No   Sig: Take 1 tablet by mouth 2 (two) times a day for 30 days   montelukast (SINGULAIR) 10 mg tablet   Yes No   Sig: Take 10 mg by mouth daily at bedtime   nefazodone (SERZONE) 150 mg tablet   Yes No   Sig: Take 150 mg by mouth 2 (two) times a day   oxyCODONE-acetaminophen (PERCOCET) 5-325 mg per tablet   Yes Yes   Sig: Take 1 tablet by mouth every 4 (four) hours as needed for moderate pain   potassium chloride (K-DUR) 10 mEq tablet   Yes No   Sig: Take 20 mEq by mouth daily   tolterodine (DETROL) 2 mg tablet   Yes No   Sig: Take 2 mg by mouth 2 (two) times a day Facility-Administered Medications: None       Past Medical History:   Diagnosis Date    [facility]-fib ([facility] )     Asthma     Cancer of spine ([facility] )     Chronic pain     HTN (hypertension)     Knee pain, chronic     r    Melanoma ([facility] )        Past Surgical History:   Procedure Laterality Date    ABDOMINAL SURGERY      CHOLECYSTECTOMY      HYSTERECTOMY      LEG SURGERY      TUMOR REMOVAL         Family History   Problem Relation Age of Onset    Cancer Mother     Cancer Father      I have reviewed and agree with the history as documented  Social History   Substance Use Topics    Smoking status: Never Smoker    Smokeless tobacco: Never Used    Alcohol use No        Review of Systems   Gastrointestinal: Positive for constipation         Physical Exam  ED Triage Vitals [10/31/17 1907]   Temperature Pulse Respirations Blood Pressure SpO2   99 1 °F (37 3 °C) 90 17 134/76 99 %      Temp Source Heart Rate Source Patient Position - Orthostatic VS BP Location FiO2 (%)   Temporal Monitor Sitting Right arm --      Pain Score       5           Orthostatic Vital Signs  Vitals:    10/31/17 1907 10/31/17 2215 10/31/17 2230   BP: 134/76 163/74 154/86   Pulse: 90 88    Patient Position - Orthostatic VS: Sitting         Physical Exam    ED Medications  Medications   sodium chloride 0 9 % bolus 1,000 mL (0 mL Intravenous Stopped 11/1/17 0115)   ondansetron (ZOFRAN) injection 4 mg (4 mg Intravenous Given 10/31/17 2152)   iohexol (OMNIPAQUE) 350 MG/ML injection (SINGLE-DOSE) 100 mL (100 mL Intravenous Given 10/31/17 2344)       Diagnostic Studies  Results Reviewed     Procedure Component Value Units Date/Time    Basic metabolic panel [[id number]]  (Abnormal) Collected:  10/31/17 2144    Lab Status:  Final result Specimen:  Blood from Arm, Left Updated:  10/31/17 2223     Sodium 132 (L) mmol/L      Potassium 3 6 mmol/L      Chloride 93 (L) mmol/L      CO2 32 mmol/L      Anion Gap 7 mmol/L      BUN 14 mg/dL      Creatinine 0 78 mg/dL Glucose 95 mg/dL      Calcium 8 1 (L) mg/dL      eGFR 70 ml/min/1 73sq m     Narrative:         National Kidney Disease Education Program recommendations are as follows:  GFR calculation is accurate only with a steady state creatinine  Chronic Kidney disease less than 60 ml/min/1 73 sq  meters  Kidney failure less than 15 ml/min/1 73 sq  meters  CBC and differential [[id number]]  (Abnormal) Collected:  10/31/17 2144    Lab Status:  Final result Specimen:  Blood from Arm, Left Updated:  10/31/17 2213     WBC 10 58 (H) Thousand/uL      RBC 3 65 (L) Million/uL      Hemoglobin 9 7 (L) g/dL      Hematocrit 32 0 (L) %      MCV 88 fL      MCH 26 6 (L) pg      MCHC 30 3 (L) g/dL      RDW 14 9 %      MPV 9 2 fL      Platelets 976 (H) Thousands/uL      Neutrophils Relative 84 (H) %      Lymphocytes Relative 8 (L) %      Monocytes Relative 8 %      Eosinophils Relative 0 %      Basophils Relative 0 %      Neutrophils Absolute 8 81 (H) Thousands/µL      Lymphocytes Absolute 0 87 Thousands/µL      Monocytes Absolute 0 86 Thousand/µL      Eosinophils Absolute 0 01 Thousand/µL      Basophils Absolute 0 03 Thousands/µL                  RADIOLOGY RESULTS   Final Result by  (11/01 1429)      CT abdomen pelvis with contrast   ED Interpretation by Lexi Schuster DO (11/01 0031)   [facility] read shows known metastatic  Lesions to the lung biliary dilation full bladder and constipation without any obstruction      Final Result by Noralee Hodgkin, MD (11/01 0801)      1  Moderate fecal stasis  2   No acute inflammatory process in the abdomen or pelvis  3   Severe intrahepatic and extrahepatic biliary ductal dilatation without obstructing lesion visualized  4   Mild bilateral renal pelvic fullness likely related to marked bladder distention  5   Bibasilar pulmonary metastasis  Trace right pleural effusion        Findings are consistent with the preliminary report from [facility] which was provided shortly after completion of the exam                       Workstation performed: [id number]                    Procedures  Procedures       Phone Contacts  ED Phone Contact    ED Course  ED Course                                MDM  CritCare Time    Disposition  Final diagnoses:   Constipation     Time reflects when diagnosis was documented in both MDM as applicable and the Disposition within this note     Time User Action Codes Description Comment    11/1/2017 12:34 AM Mitchell Koenig [K59 [id number]] Constipation       ED Disposition     ED Disposition Condition Comment    [address] discharge to home/self care      Condition at discharge: Stable        Follow-up Information     Follow up With Specialties Details Why [address], [address] In 1 day  [address] [facility] [address]  [phone]          Discharge Medication List as of 11/1/2017 12:35 AM      CONTINUE these medications which have NOT CHANGED    Details   oxyCODONE-acetaminophen (PERCOCET) 5-325 mg per tablet Take 1 tablet by mouth every 4 (four) hours as needed for moderate pain, Historical Med      albuterol (2 5 mg/3 mL) 0 083 % nebulizer solution Take 2 5 mg by nebulization every 6 (six) hours as needed for wheezing, Until Discontinued, Historical Med      apixaban (ELIQUIS) 2 5 mg Take 1 tablet by mouth 2 (two) times a day for 30 days, Starting Sun 1/15/2017, Until Tue 9/19/2017, Print      digoxin (LANOXIN) 0 125 mg tablet Take 1 tablet by mouth daily for 30 days, Starting Thu 3/9/2017, Until Tue 9/19/2017, Print      fentaNYL (DURAGESIC) 50 mcg/hr Place 1 patch on the skin every third day, Until Discontinued, Historical Med      fluticasone (FLONASE) 50 mcg/act nasal spray 2 sprays into each nostril daily  , Until Discontinued, Historical Med      fluticasone-salmeterol (ADVAIR) 250-50 mcg/dose inhaler Inhale 1 puff every 12 (twelve) hours, Until Discontinued, Historical Med furosemide (LASIX) 20 mg tablet Take 1 tablet by mouth daily for 30 days, Starting Sun 1/15/2017, Until Tue 9/19/2017, Print      metoprolol succinate (TOPROL-XL) 50 mg 24 hr tablet Take 1 tablet by mouth 2 (two) times a day for 30 days, Starting Thu 3/9/2017, Until Tue 9/19/2017, Print      montelukast (SINGULAIR) 10 mg tablet Take 10 mg by mouth daily at bedtime, Until Discontinued, Historical Med      nefazodone (SERZONE) 150 mg tablet Take 150 mg by mouth 2 (two) times a day, Until Discontinued, Historical Med      potassium chloride (K-DUR) 10 mEq tablet Take 20 mEq by mouth daily, Until Discontinued, Historical Med      tolterodine (DETROL) 2 mg tablet Take 2 mg by mouth 2 (two) times a day, Until Discontinued, Historical Med           No discharge procedures on file      ED Provider  Electronically Signed by           Yoshi Johnson,   11/01/17 [address] [facility], DO  11/01/17 2000

## 2018-01-10 NOTE — MISCELLANEOUS
Message   Recorded as Task   Date: 02/14/2017 02:56 PM, Created By: Ynes Garza   Task Name: Follow Up   Assigned To: Estrella Junior   Regarding Patient: Adelia Colin, Status: In Progress   Comment:    Brenda Meade - 14 Feb 2017 2:56 PM     TASK CREATED  Reji Quintana from [place] [address] (Chemo Room) called because patient's Daughter Suhail Maik called and CX patient's chemo appointment again (twice in a row) for tomorrow 02/15/2017 stating that the patient is still not better  She is out of breathe and having trouble breathing and they feel she still has pneumonia  Suhail Pleitez informed Reji Quintana that the PCP is aware of these symptoms but is waiting for the patient to see Pulmonary in 03/2017  I did advise Reji Quintana that patient is supposed to have a repeat chest xray this week to check on her pneumonia  ***Per our conversation I did call and try to speak with Suhail Pleitez (daughter); however, Mary Rhoades (Grand-daughter) answered the phone  She was advised that since the patient is still having these symptoms and doesn't feel she is well enough to go to chemo that we would like the family to take her for her repeat chest xray today  Mary Rhoades stated this would not be possible and I then asked for her to take the patient tomorrow morning for this  I stated I would look for the report in [facility] and ask it to be read tomorrow so we can review it and decide if patient is well enough to reschedule her chemo treatments  Please send this task back to me so I can remember to look for the results tomorrow (Wed)  Thank you  Suyapa Sepulveda - 14 Feb 2017 3:20 PM     TASK REPLIED TO: Previously Assigned To Anna Borrero  In addition, I see that she is scheduled for pulmonary on march 3rd , can we move this up? Brenda Meade - 14 Feb 2017 3:43 PM     TASK REPLIED TO: Previously Assigned To Brenda Meade  I called and the  said that they have nothing sooner    I asked if they had a cancelation list and she said they do and will put this patient on the list   Thank you  Arnoldo Haro - 14 Feb 2017 4:04 PM     TASK REASSIGNED: Previously Assigned To Virginia Morin  back to you as requested   Brenda Meade - 14 Feb 2017 4:44 PM     TASK IN PROGRESS   Brenda Meade - 15 Feb 2017 12:19 PM     TASK EDITED  No chest xray in the system as of yet  Brenda Meade - 16 Feb 2017 11:18 AM     TASK EDITED  xray done but no report ready yet  Will keep checking  Brenda Meade - 16 Feb 2017 1:52 PM     TASK REASSIGNED: Previously Assigned To Brenda Meade  Please see below messages and take a look at the new xray result in the chart please  Thank you  Dat Mendoza - 97 Feb 2017 3:32 PM     TASK REPLIED TO: Previously Assigned To Dat Mendoza   Patient's x-ray shows some resolution of her opacity but it is not completely resolved  This does NOT mean that patient's pneumonia is still active  If patient feels better she can go for chemotherapy  If She feels better but quite not up to going for chemotherapy, she could wait and go next week   Brenda Meade - 17 Feb 2017 8:21 AM     TASK REPLIED TO: Previously Assigned To [employer] with patient's daughter Zachery Mtz  She is aware of the plan          Signatures   Electronically signed by : Benigno Trujillo DO; Feb 17 2017  6:24PM EST                       (Author)

## 2018-01-10 NOTE — PROGRESS NOTES
Assessment  Assessed    1  Abnormal EKG (794 31) (R94 31)   2  Cardiomyopathy (425 4) (I42 9)   3  Edema (782 3) (R60 9)   4  Hypercholesteremia (272 0) (E78 0)   5  Hypertension (401 9) (I10)   6  Preoperative cardiovascular examination (V72 81) (Z01 810)   7  PVC (premature ventricular contraction) (427 69) (I49 3)   8  Palpitations (785 1) (R00 2)    Plan  Cardiomyopathy    · Follow-up visit in 3 months Evaluation and Treatment  Follow-up  Status: Hold For -  Scheduling  Requested for: 38BDF9865   Ordered; For: Cardiomyopathy; Ordered By: Judith Ornelas Performed:  Due: 60YTM1166  Hypertension    · From  AmLODIPine Besylate 10 MG Oral Tablet TAKE 1 TABLET DAILY AS  DIRECTED To AmLODIPine Besylate 10 MG Oral Tablet take 1/2 tablet daily   Rx By: Judith Ornelas; Dispense: 90 Days ; #:90 Tablet; Refill: 0; For: Hypertension; BRADEN = N; Record    Discussion/Summary  Cardiology Discussion Summary Free Text Note Form  [place]:   Dear Yamilet English and Dr Vesta Hamilton,    I had the pleasure of having Jose Martin Perez return for preoperative risk assessment because of the recent discovery of PVCs and cardiomyopathy  She is to undergo scalp growth resection and possibly right neck mass biopsy/resection  At this time she is stable to proceed at low to moderate risk  HTN - controlled, but has 1+ edema, no volume overload by exam otherwise (no JVD)  I recommended reducing Amlodipine to 5mg daily  HL - controlled    Tachycardia - narrow complex, nothing significant on Holter, but she was 110-120 in my office last visit appears to have been PAT  No recurrence after increasing Metoprolol to 50mg  PVC's - 10,000 on Holter with rare NSVT  Cardiomyopathy - new diagnosis  Very mild CHF  Already on high dose Lisinopril and HCTZ  Parul Palomino showed very small apical anterior fixed defect without focal wall motion abnormality  Globally reduced EF 42%  At this time I presume she has a nonischemic cardiomyopathy of unclear etiology   Continue current medical therapy with beta blocker, ACE inhibitor, HCTZ  I will assess whether or not her edema, which is mild, improved with lowering her amlodipine dose  She may need a more potent diuretic in the future such as furosemide if her edema does not improve  Patient Clearance: Surgical Clearance: She is at a LOW TO MODERATE risk from a cardiovascular standpoint at this time without any additional cardiac testing  Reevaluation needed, if she should present with symptoms prior to surgery/procedure  Chief Complaint  Chief Complaint Free Text Note Form: 2 week f/u   Chief Complaint Chronic Condition [facility]: Patient is here today for follow up of chronic conditions described in HPI  History of Present Illness  Cardiology HPI Free Text Note Form [facility] [place]: Bernard Kaur returns to my office having had her Azck Snooks stress test and for preoperative cardiovascular recommendations  she denies palpitations, chest pain, shortness of breath, but does have mild edema  She takes 10 mg of amlodipine for hypertension  She's been on as a long time  She takes HCTZ as well  She denies orthopnea  She has numerous PVCs which warranted further testing, with discovery of a global cardiomyopathy, 37% by echo  Her stress test revealed an EF of 42%, global reduced wall motion  He was a very small apical anterior fixed defect, not particularly suggestive of scar  I suspect she has a nonischemic cardiomyopathy  Review of Systems  Cardiology Female ROS:     Cardiac: as noted in HPI  Skin: as noted in HPI   Genitourinary: post menopausal, but no kidney problems   Psychological: No complaints of feeling depressed, anxiety, panic attacks, or difficulty concentrating  General: No complaints of trouble sleeping, lack of energy, fatigue, appetite changes, weight changes, fever, frequent infections, or night sweats  Respiratory: No complaints of shortness of breath, cough with sputum, or wheezing     HEENT: No complaints of serious problems, hearing problems, nose problems, throat problems, or snoring  Gastrointestinal: No complaints of liver problems, nausea, vomiting, heartburn, constipation, bloody stools, diarrhea, problems swallowing, adbominal pain, or rectal bleeding  Hematologic: No complaints of bleeding disorders, anemia, blood clots, or excessive brusing  Neurological: No complaints of numbness, tingling, dizziness, weakness, seizures, headaches, syncope or fainting, AM fatigue, daytime sleepiness, no witnessed apnea episodes  Musculoskeletal: No complaints of arthritis, back pain, or painfull swelling  ROS Reviewed:   ROS reviewed  Active Problems  Problems    1  Abnormal EKG (794 31) (R94 31)   2  Allergic rhinitis (477 9) (J30 9)   3  Asthma (493 90) (J45 909)   4  Cardiomyopathy (425 4) (I42 9)   5  Colonoscopy (Fiberoptic) Screening   6  Edema (782 3) (R60 9)   7  Encounter for routine gynecological examination (V72 31) (Z01 419)   8  Encounter for screening for cardiovascular disorders (V81 2) (Z13 6)   9  Encounter for screening mammogram for malignant neoplasm of breast (V76 12)   (Z12 31)   10  Fatigue (780 79) (R53 83)   11  Glaucoma Screening   12  Hypercholesteremia (272 0) (E78 0)   13  Hypertension (401 9) (I10)   14  Hypokalemia (276 8) (E87 6)   15  LFTs abnormal (790 6) (R79 89)   16  Limb pain (729 5) (M79 609)   17  Limb swelling (729 81) (M79 89)   18  Long term use of drug (V58 69) (Z79 899)   19  Mass of right side of neck (784 2) (R22 1)   20  Need for prophylactic vaccination and inoculation against influenza (V04 81) (Z23)   21  Palpitations (785 1) (R00 2)   22  Preoperative cardiovascular examination (V72 81) (Z01 810)   23  Preoperative clearance (V72 84) (Z01 818)   24  Prolapse of bladder   25  PVC (premature ventricular contraction) (427 69) (I49 3)   26  Screening for genitourinary condition (V81 6) (Z13 89)   27  Screening for osteoporosis (V82 81) (Z13 820)   28   Skin lesion of scalp (709 9) (L98 9)   29  Visit For: Screening Exam Hypertension (V81 1)   30  Visit For: Screening Exam Neurological Disorders (V80 09)    Past Medical History  Problems    1  History of Blood pressure elevated (796 2) (I10)   2  History of allergic rhinitis (V12 69) (Z87 09)   3  History of low back pain (V13 59) (Z87 39)   4  History of urinary incontinence (V13 09) (Z87 898)   5  History of Impacted cerumen, unspecified laterality (380 4) (H61 20)   6  Need for prophylactic vaccination and inoculation against influenza (V04 81) (Z23)  Active Problems And Past Medical History Reviewed: The active problems and past medical history were reviewed and updated today  Surgical History  Surgical History Reviewed: The surgical history was reviewed and updated today  Family History  Mother    1  Family history of   Father    2  Family history of    3  Family history of lung cancer (V16 1) (Z80 1)  Family History Reviewed: The family history was reviewed and updated today  Social History  Problems    · Never A Smoker   · Never Drank Alcohol   · Never Used Drugs  Social History Reviewed: The social history was reviewed and updated today  The social history was reviewed and is unchanged  Current Meds   1  AmLODIPine Besylate 10 MG Oral Tablet; TAKE 1 TABLET DAILY AS DIRECTED; Therapy: 59XUE6062 to (Evaluate:44Fwi1626)  Requested for: 60TKP6958; Last   Rx:2016 Ordered   2  FentaNYL 50 MCG/HR Transdermal Patch 72 Hour; apply  patch every 72 hours; Therapy: 03LYS4487 to (Last DM:88KMN1206) Ordered   3  Fluticasone Propionate 50 MCG/ACT Nasal Suspension; 2 SPRAY(S) EACH NOSTRIL   DAILY; Therapy: 11WDL8356 to (Evaluate:82Bqc7986)  Requested for: 59DWQ4847; Last   Rx:2015 Ordered   4  Hydrochlorothiazide 25 MG Oral Tablet; take 1 tablet every day; Therapy: 03Npu4230 to (Evaluate:01Ply7984)  Requested for: 00JCD1120; Last   Rx:2016 Ordered   5   Klor-Con M20 20 MEQ Oral Tablet Extended Release; take 1 tablet by mouth every day; Therapy: 05RMN4882 to (Evaluate:15Jan2017)  Requested for: 21Jan2016; Last   Rx:21Jan2016 Ordered   6  Lisinopril 40 MG Oral Tablet; TAKE 1 TABLET EVERY DAY  Requested for: 47RPO4133;   Last Rx:15Oct2012 Ordered   7  Metoprolol Succinate ER 50 MG Oral Tablet Extended Release 24 Hour; TAKE 1 TABLET   ONCE DAILY; Therapy: 05FGA8385 to (Evaluate:43Qvj6588)  Requested for: 75FMJ8022; Last   Rx:05Jan2016 Ordered   8  Montelukast Sodium 10 MG Oral Tablet; take 1 tablet every day; Therapy: 80MTJ7672 to (Evaluate:31Ern1751)  Requested for: 54KPG9162; Last   Rx:19Oct2015 Ordered   9  Nefazodone HCl - 150 MG Oral Tablet; Take 1 tablet by mouth at bedtime; Therapy: 97KTZ2698 to (Evaluate:49Lem0409)  Requested for: 57DWT4693; Last   Rx:78Aqb7224 Ordered   10  Tolterodine Tartrate 2 MG Oral Tablet; take 1 tablet by mouth twice a day; Therapy: 98OTB8547 to (Evaluate:17Mar2016)  Requested for: 50YRS5757; Last    Rx:19Oct2015 Ordered  Medication List Reviewed: The medication list was reviewed and updated today  Allergies  Medication    1  No Known Drug Allergies    Vitals  Vital Signs [Data Includes: Current Encounter]    Recorded: 13DGC9571 11:50AM   Heart Rate 72   Respiration 14   Systolic 417   Diastolic 60   Height 5 ft    Weight 108 lb    BMI Calculated 21 09   BSA Calculated 1 44     Physical Exam    Constitutional   General appearance: No acute distress, well appearing and well nourished  Eyes   Conjunctiva and Sclera examination: Conjunctiva pink, sclera anicteric  Ears, Nose, Mouth, and Throat - Oropharynx: Clear, nares are clear, mucous membranes are moist    Neck   Neck and thyroid: Normal, supple, trachea midline, no thyromegaly  No JVD  Pulmonary   Respiratory effort: No increased work of breathing or signs of respiratory distress      Auscultation of lungs: Clear to auscultation, no rales, no rhonchi, no wheezing, good air movement  Cardiovascular   Auscultation of heart: Normal rate and rhythm, normal S1 and S2, no murmurs  ectopic beats noted  Carotid pulses: Normal, 2+ bilaterally  Pedal pulses: Normal, 2+ bilaterally  Examination of extremities for edema and/or varicosities: Normal     Abdomen   Abdomen: Non-tender and no distention  Musculoskeletal Gait and station: Normal gait  Digits and nails: Normal without clubbing or cyanosis  Skin - Skin and subcutaneous tissue: Normal without rashes or lesions  Skin is warm and well perfused, normal turgor  Neurologic - Cranial nerves: II - XII intact  Speech: Normal     Psychiatric - Orientation to person, place, and time: Normal  Mood and affect: Normal       Results/Data  Diagnostic Studies Reviewed Cardio:   Echocardiogram/STEVIE: 12/15 - EF 37%, IVC normal, Grade 2 DD, mild MR  Holter/Event Recorder: 12/15 - SR , avg 94, 4500 PAC's, few runs of SVT, 25356 PVC, 1 NSVT 4 beats  ECG Report: 12/21/15 - 1st ECG - Atrial tachycardia with LVH/repol changes, PVC   12/21/15 - 2nd ECG - SR, PVC, LVH with repol  Health Management  Colonoscopy (Fiberoptic) Screening   (every) FECAL GLOBIN BY IMMUNOCHEM  (MEDICARE); every 1 year; Last 76FFJ1733; Next Due:  86EIV3172; Overdue  Encounter for screening mammogram for malignant neoplasm of breast   Digital Bilateral Screening Mammogram With CAD; every 1 year; Last 07EDU9388; Next Due:  14LSQ5936; Overdue  Hypertension   EKG/ECG- POC; every 1 year; Last 52Lag1060; Next Due: 14Ahl2375;  Active    Signatures   Electronically signed by : Jaime Kelly MD; Jan 22 2016 12:16PM EST                       (Author)

## 2018-01-11 NOTE — MISCELLANEOUS
Assessment    1  History of pneumonia (V12 61) (Z87 01)   2  Pneumonia (486) (J18 9)   3  Afib (427 31) (I48 91)   4  Hypertension (401 9) (I10)   5  CHF (congestive heart failure) (428 0) (I50 9)    Plan  Asthma    · Albuterol Sulfate (2 5 MG/3ML) 0 083% Inhalation Nebulization Solution; USE 1  UNIT DOSE EVERY 4-6 HOURS AS NEEDED FOR WHEEZING    Rx By: Moses Pierre; Dispense: 14 Days ; #:1 X 3 ML Plas Cont (25 Plas Conts); Refill: 3; For: Asthma; BRADEN = N; Verified Transmission to [facility] Last Updated By: System, [employer]; 1/19/2017 4:23:47 PM  Encounter for screening mammogram for malignant neoplasm of breast    · * MAMMO SCREENING BILATERAL W CAD; Status:Active; Requested XZY:54BXM1548;    Perform:[facility]; KXS:07COM2970; Ordered;  For:Encounter for screening mammogram for malignant neoplasm of breast; Ordered By:Roma Morin;  Hypertension, Hypokalemia    · Follow-up visit in 2 weeks Evaluation and Treatment  Follow-up  30 min  Status: Hold For - Scheduling  Requested for: 71QJS1080   Ordered; For: Hypertension, Hypokalemia; Ordered By: Moses Pierre Performed:  Due: 83IMQ7918  Pneumonia    · * XR CHEST PA & LATERAL; Status:Active; Requested for:65Txx9872;    Perform:[facility]; NLB:98CSZ2043; Last Updated Octavio Naranjo; 1/19/2017 4:34:01 PM;Ordered; For:Pneumonia; Ordered By:Virginia Morin;      A/P  1  pneumonia: santiago chest xray in one month to follow till clear  finish the prednisone and the  levaquin  is using the advair 2 times a day  has the neb but is not using this  Chaim Po we will santiago you in 2 weeks but please call if    2  CHF: you are on furosimide  20 mg for this  Please cut this in 1/2 and only take this once a day  we will check blood work for this next week  and you have the lab slip for this    3  Afib: you are on metoprolol and eliquis for this  4  HTN: you are on metoprolol, nefazodone and the furosemide we for this           rto 2 weeks to santiago this      Chief Complaint  Chief Complaint Free Text Note Form: Patient presents to the office today for ANNITA and medication updates  Colon is def/  Mammogram is due after 05/22/2015  Pap test is def  EKG is due after 01/10/2018  Yellow Sheet is done for 2016/2017  AWV is due after 08/26/2015  History of Present Illness  TCM Communication  [facility]: The patient is being contacted for follow-up after hospitalization  Hospital records were reviewed  She was hospitalized at [facility]  The date of admission: 01/10/2017, date of discharge: 01/15/2017  Diagnosis: Respiratory Failure, Pneumonia, AFIB  She was discharged to home, with home health services  [facility] Medications reviewed and updated today  She scheduled a follow up appointment  Follow-up appointments with other specialists: PCP and Cardio  Symptoms: weakness  The patient is currently experiencing symptoms  Communication performed and completed by TILA Peralta MA   HPI: Here today for annita visit with her grandaughter and daughter  got an asthma attack 1 1/2 weeks ago and was treating with her neb at home  was just getting worse and ended up in [facility] with [facility] and influenza  was admitted on January 10 and was discharged on the 15    was treated with tamiflu and levaquin  while there was found to be in CHF with a fib as well     was started on eliquis,  metoprolol and lasix  is feeling much better  increased energy   still has a couple more days or prednisone and levaquin  wheeze and cough have resolved  no fevers  Review of Systems  Complete-Female:   Constitutional: No fever, no chills, feels well, no tiredness, no recent weight gain or weight loss  ENT: no complaints of earache, no loss of hearing, no nose bleeds, no nasal discharge, no sore throat, no hoarseness  Cardiovascular: No complaints of slow heart rate, no fast heart rate, no chest pain, no palpitations, no leg claudication, no lower extremity edema  Respiratory: No complaints of shortness of breath, no wheezing, no cough, no SOB on exertion, no orthopnea, no PND  Musculoskeletal: No complaints of arthralgias, no myalgias, no joint swelling or stiffness, no limb pain or swelling  Integumentary: No complaints of skin rash or lesions, no itching, no skin wounds, no breast pain or lump  Neurological: No complaints of headache, no confusion, no convulsions, no numbness, no dizziness or fainting, no tingling, no limb weakness, no difficulty walking  Active Problems    1  Allergic rhinitis (477 9) (J30 9)   2  Asthma (493 90) (J45 909)   3  Cardiomyopathy (425 4) (I42 9)   4  Colonoscopy (Fiberoptic) Screening   5  Diarrhea (787 91) (R19 7)   6  Edema (782 3) (R60 9)   7  Encounter for routine gynecological examination (V72 31) (Z01 419)   8  Encounter for screening for cardiovascular disorders (V81 2) (Z13 6)   9  Encounter for screening mammogram for malignant neoplasm of breast (V76 12)   (Z12 31)   10  Fatigue (780 79) (R53 83)   11  Glaucoma Screening   12  Hypercholesteremia (272 0) (E78 00)   13  Hypertension (401 9) (I10)   14  Hypokalemia (276 8) (E87 6)   15  LFTs abnormal (790 6) (R79 89)   16  Long term use of drug (V58 69) (Z79 899)   17  Need for prophylactic vaccination and inoculation against influenza (V04 81) (Z23)   18  Preoperative cardiovascular examination (V72 81) (Z01 810)   19  Preoperative clearance (V72 84) (Z01 818)   20  Prolapse of bladder   21  PVC (premature ventricular contraction) (427 69) (I49 3)   22  Screening for genitourinary condition (V81 6) (Z13 89)   23  Screening for osteoporosis (V82 81) (Z13 820)   24  Skin lesion of scalp (709 9) (L98 9)   25  Vertigo (780 4) (R42)   26  Visit For: Screening Exam Hypertension (V81 1)   27  Visit For: Screening Exam Neurological Disorders (V80 09)    Past Medical History    1  History of Blood pressure elevated (401 9) (I10)   2   History of allergic rhinitis (V12 69) (Z87 09)   3  History of low back pain (V13 59) (Z87 39)   4  History of pneumonia (V12 61) (Z87 01)   5  History of urinary incontinence (V13 09) (Z87 898)   6  History of Impacted cerumen, unspecified laterality (380 4) (H61 20)   7  Need for prophylactic vaccination and inoculation against influenza (V04 81) (Z23)    Family History  Mother    1  Family history of   Father    2  Family history of    3  Family history of lung cancer (V16 1) (Z80 1)    Social History    · Never A Smoker   · Never Drank Alcohol   · Never Used Drugs    Current Meds   1  Advair Diskus 250-50 MCG/DOSE Inhalation Aerosol Powder Breath Activated; INHALE 1   PUFF EVERY 12 HOURS; Therapy: (Recorded:2017) to Recorded   2  Albuterol 90 MCG/ACT AERS; INHALE 1 TO 2 PUFFS EVERY 4 TO 6 HOURS AS   NEEDED; Therapy: (Recorded:2017) to Recorded   3  Ativan 1 MG Oral Tablet; TAKE 0 5 TABLET Twice daily; Therapy: (Recorded:2017) to Recorded   4  Dextromethorphan-Guaifenesin  MG/5ML Oral Syrup; TAKE 10 ML  EVERY 4-6   HOURS AS NEEDED; Therapy: (Recorded:2017) to Recorded   5  Eliquis 2 5 MG Oral Tablet; Take 1 tablet twice daily; Therapy: (Recorded:2017) to Recorded   6  FentaNYL 50 MCG/HR Transdermal Patch 72 Hour; apply  patch every 72 hours; Therapy: 87NIL5218 to (Last Rx:22Mjl3358) Ordered   7  Fluticasone Propionate 50 MCG/ACT Nasal Suspension; USE 2 SPRAYS IN EACH   NOSTRIL EVERY DAY; Therapy: 41KXV6203 to (Evaluate:35Aao2020)  Requested for: 2016; Last   Rx:2016 Ordered   8  Furosemide 20 MG Oral Tablet; TAKE 1 TABLET DAILY AS DIRECTED; Therapy: (Recorded:2017) to Recorded   9  Klor-Con M20 20 MEQ Oral Tablet Extended Release; take 1 tablet by mouth every day; Therapy: 59AWC0854 to (Evaluate:2017)  Requested for: 2016; Last   Rx:2016 Ordered   10  Levaquin 750 MG Oral Tablet; TAKE 0 5 TABLET Twice daily;     Therapy: (Recorded:2017) to Recorded 11  Meclizine HCl - 12 5 MG Oral Tablet; TAKE 1 TABLET 3 TIMES DAILY AS NEEDED; Therapy: 88HUI4213 to (Evaluate:24Nov2016)  Requested for: 42ZTJ7930; Last    Rx:04Nov2016 Ordered   12  Metoprolol Succinate ER 50 MG Oral Tablet Extended Release 24 Hour; TAKE 1 TABLET    ONCE DAILY; Therapy: 93CBE3955 to (Evaluate:82Rnw9845)  Requested for: 10IFR5203; Last    Rx:05Jan2016 Ordered   13  Metoprolol Tartrate 37 5 MG Oral Tablet; take 2 tablet daily; Therapy: (Recorded:19Jan2017) to Recorded   14  Montelukast Sodium 10 MG Oral Tablet; take 1 tablet every day; Therapy: 57KZO0788 to [phone])  Requested for: 31Oct2016; Last    Rx:31Oct2016 Ordered   15  Nefazodone HCl - 150 MG Oral Tablet; Take 1 tablet by mouth at bedtime; Therapy: 98JDY5730 to (Ruby Schmidt)  Requested for: 55BMX4733; Last    Rx:28Nov2016 Ordered   16  PredniSONE 10 MG Oral Tablet; as directed; Therapy: (Recorded:19Jan2017) to Recorded   17  Tolterodine Tartrate 2 MG Oral Tablet; take 1 tablet twice a day; Therapy: 18RDD1065 to (Savannah Henry)  Requested for: 25SEL9570; Last    Rx:04Jan2017 Ordered   18  TraMADol HCl - 50 MG Oral Tablet; TAKE 1 TABLET Every 8 hours; Therapy: (Recorded:19Jan2017) to Recorded    Allergies    1  No Known Drug Allergies    Vitals  Signs   Recorded: 81IAJ0805 03:47PM   Temperature: 97 8 F  Heart Rate: 68  Respiration: 20  Systolic: 489  Diastolic: 70  Weight: 96 lb 4 00 oz    Physical Exam    Constitutional   General appearance: No acute distress, well appearing and well nourished  Ears, Nose, Mouth, and Throat   Otoscopic examination: Tympanic membranes translucent with normal light reflex  Canals patent without erythema  Nasal mucosa, septum, and turbinates: Normal without edema or erythema  Oropharynx: Normal with no erythema, edema, exudate or lesions  Pulmonary   Auscultation of lungs: Clear to auscultation  hypoactive but CTA     Cardiovascular   Auscultation of heart: Abnormal   irregular  Examination of extremities for edema and/or varicosities: Normal     Carotid pulses: Normal     Lymphatic   Palpation of lymph nodes in neck: No lymphadenopathy  Skin   Skin and subcutaneous tissue: Normal without rashes or lesions      Psychiatric   Orientation to person, place, and time: Normal     Mood and affect: Normal          Future Appointments    Date/Time Provider Specialty Site   02/02/2017 11:30 AM Sarah Morgan, [address]     Signatures   Electronically signed by : Gage Cunha [facility]; Jan 19 2017  5:15PM EST                       (Author)    Electronically signed by : Sandra Sloan DO; Jan 19 2017  5:51PM EST

## 2018-01-12 VITALS
SYSTOLIC BLOOD PRESSURE: 104 MMHG | DIASTOLIC BLOOD PRESSURE: 70 MMHG | WEIGHT: 103.38 LBS | HEART RATE: 100 BPM | RESPIRATION RATE: 16 BRPM | BODY MASS INDEX: 19.53 KG/M2

## 2018-01-13 VITALS
WEIGHT: 100.13 LBS | BODY MASS INDEX: 19.66 KG/M2 | DIASTOLIC BLOOD PRESSURE: 60 MMHG | HEART RATE: 91 BPM | SYSTOLIC BLOOD PRESSURE: 118 MMHG | HEIGHT: 60 IN

## 2018-01-13 VITALS
BODY MASS INDEX: 17.96 KG/M2 | RESPIRATION RATE: 20 BRPM | WEIGHT: 91.5 LBS | DIASTOLIC BLOOD PRESSURE: 66 MMHG | SYSTOLIC BLOOD PRESSURE: 104 MMHG | TEMPERATURE: 97.8 F | HEART RATE: 80 BPM | HEIGHT: 60 IN

## 2018-01-13 VITALS
TEMPERATURE: 98.1 F | HEIGHT: 60 IN | HEART RATE: 80 BPM | SYSTOLIC BLOOD PRESSURE: 112 MMHG | DIASTOLIC BLOOD PRESSURE: 60 MMHG | RESPIRATION RATE: 20 BRPM | WEIGHT: 98.44 LBS | BODY MASS INDEX: 19.33 KG/M2

## 2018-01-13 VITALS
SYSTOLIC BLOOD PRESSURE: 114 MMHG | BODY MASS INDEX: 18.19 KG/M2 | WEIGHT: 96.25 LBS | RESPIRATION RATE: 20 BRPM | TEMPERATURE: 97.8 F | DIASTOLIC BLOOD PRESSURE: 70 MMHG | HEART RATE: 68 BPM

## 2018-01-13 VITALS
DIASTOLIC BLOOD PRESSURE: 68 MMHG | HEART RATE: 122 BPM | OXYGEN SATURATION: 96 % | WEIGHT: 103 LBS | SYSTOLIC BLOOD PRESSURE: 98 MMHG | BODY MASS INDEX: 20.22 KG/M2 | HEIGHT: 60 IN | TEMPERATURE: 98.3 F | RESPIRATION RATE: 16 BRPM

## 2018-01-13 VITALS
DIASTOLIC BLOOD PRESSURE: 62 MMHG | WEIGHT: 102.44 LBS | BODY MASS INDEX: 20.11 KG/M2 | HEIGHT: 60 IN | RESPIRATION RATE: 16 BRPM | SYSTOLIC BLOOD PRESSURE: 100 MMHG | HEART RATE: 135 BPM

## 2018-01-14 VITALS
TEMPERATURE: 97.7 F | SYSTOLIC BLOOD PRESSURE: 122 MMHG | WEIGHT: 97.31 LBS | RESPIRATION RATE: 16 BRPM | HEART RATE: 88 BPM | DIASTOLIC BLOOD PRESSURE: 66 MMHG | BODY MASS INDEX: 19 KG/M2

## 2018-01-14 VITALS
HEART RATE: 80 BPM | SYSTOLIC BLOOD PRESSURE: 126 MMHG | DIASTOLIC BLOOD PRESSURE: 62 MMHG | BODY MASS INDEX: 18.32 KG/M2 | TEMPERATURE: 98 F | RESPIRATION RATE: 16 BRPM | WEIGHT: 93.31 LBS | HEIGHT: 60 IN

## 2018-01-15 VITALS
HEART RATE: 68 BPM | WEIGHT: 98.53 LBS | RESPIRATION RATE: 16 BRPM | DIASTOLIC BLOOD PRESSURE: 68 MMHG | SYSTOLIC BLOOD PRESSURE: 126 MMHG | BODY MASS INDEX: 19.24 KG/M2

## 2018-01-15 NOTE — MISCELLANEOUS
Chief Complaint  Chief Complaint Free Text Note Form: Spoke with patient today to schedule her ANNITA and she will call back on Monday to schedule this after she schedule's her specialist follow ups   03/13/2017 Spoke with patient's grand daughter she will call back to schedule today as she did not want to schedule when I called her    03/16/2017 patient's daughter called to cx patient's ANNITA today  She states she will call back to reschedule this; if she does she needs to be scheduled before 03/23/2017 or it is just a hospital followup when billed  03/24/2017 No ANNITA code posted in IDX  History of Present Illness  TCM Communication [facility]: The patient is being contacted for follow-up after hospitalization  Hospital records were reviewed  She was hospitalized at [facility]  The date of admission: 03/03/2017, date of discharge: 03/09/2017  Diagnosis: 1  Respiratory Abnormality 2  Rapid Atrial Fibrillation 3  CHF  She was discharged to home, with home health services  [facility] has not yet contacted patient to schedule home visit as of 03/10/2017 AM    Follow-up appointments with other specialists: Crystal Dalton and Patient reports Pulmonology  Patient reports no sx and breathing better; however, when asked how she is feeling she states not too good  Communication performed and completed by TILA Ma MA      Active Problems     1  Abnormal PFT (794 2) (R94 2)   2  Allergic rhinitis (477 9) (J30 9)   3  Anxiety (300 00) (F41 9)   4  Asthma (493 90) (J45 909)   5  Atrial fibrillation with RVR (427 31) (I48 91)   6  Cardiomyopathy (425 4) (I42 9)   7  Colonoscopy (Fiberoptic) Screening   8  Hypercholesteremia (272 0) (E78 00)   9  Hypertension (401 9) (I10)   10  Hypokalemia (276 8) (E87 6)   11  Hypoxia (799 02) (R09 02)   12  LFTs abnormal (790 6) (R79 89)   13  Long term use of drug (V58 69) (Z79 899)   14  Melanoma of skin (172 9) (C43 9)   15   Need for prophylactic vaccination and inoculation against influenza (V04 81) (Z23)   16  Pneumonia (486) (J18 9)   17  Preoperative cardiovascular examination (V72 81) (Z01 810)   18  Preoperative clearance (V72 84) (Z01 818)   19  Primary hypothyroidism (244 9) (E03 9)   20  Prolapse of bladder   21  PVC (premature ventricular contraction) (427 69) (I49 3)   22  Screening for genitourinary condition (V81 6) (Z13 89)   23  Screening for osteoporosis (V82 81) (Z13 820)   24  SOB (shortness of breath) (786 05) (R06 02)   25  Tachycardia (785 0) (R00 0)    Afib (427 31) (I48 91)       CHF (congestive heart failure) (428 0) (I50 9)          Past Medical History    1  History of Blood pressure elevated (401 9) (I10)   2  History of allergic rhinitis (V12 69) (Z87 09)   3  History of diarrhea (V12 79) (Z87 898)   4  History of low back pain (V13 59) (Z87 39)   5  History of pneumonia (V12 61) (Z87 01)   6  History of urinary incontinence (V13 09) (Z87 898)   7  History of Impacted cerumen, unspecified laterality (380 4) (H61 20)   8  Need for prophylactic vaccination and inoculation against influenza (V04 81) (Z23)    Surgical History    1  History of Cholecystectomy   2  History of Hysterectomy   3  History of Neck Surgery   4  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    Family History  Mother    1  Family history of   Father    2  Family history of    3  Family history of lung cancer (V16 1) (Z80 1)    Social History    · Never A Smoker   · Never Drank Alcohol   · Never Used Drugs    Current Meds   1  Advair Diskus 250-50 MCG/DOSE Inhalation Aerosol Powder Breath Activated; TAKE 1   PUFF EVERY 12 HOURS; Therapy: 95GOL0553 to (Evaluate:33Alf6391)  Requested for: 07AIN6122; Last   Rx:82Zjd8946 Ordered   2  Albuterol 90 MCG/ACT AERS; INHALE 1 TO 2 PUFFS EVERY 4 TO 6 HOURS AS   NEEDED; Therapy: (Recorded:2017) to Recorded   3   Albuterol Sulfate (2 5 MG/3ML) 0 083% Inhalation Nebulization Solution; USE 1 UNIT   DOSE EVERY 4-6 HOURS AS NEEDED FOR WHEEZING ; Therapy: 76YBC7519 to (Evaluate:30Mar2017)  Requested for: 99PHF4417; Last   Rx:39Lvs0709 Ordered   4  Eliquis 2 5 MG Oral Tablet; Take 1 tablet twice daily  Requested for: 57EOX4984; Last   Rx:14Feb2017 Ordered   5  FentaNYL 50 MCG/HR Transdermal Patch 72 Hour; apply  patch every 72 hours; Therapy: 44RAB5384 to (Last Rx:02Feb2017) Ordered   6  Furosemide 20 MG Oral Tablet; take 0 5 tablet daily  Requested for: 10ZQP2413; Last   Rx:14Feb2017 Ordered   7  Klor-Con M20 20 MEQ Oral Tablet Extended Release; take 1 tablet by mouth every day; Therapy: 92CUG1127 to [phone])  Requested for: 92PIB2058; Last   Rx:02Feb2017 Ordered   8  Levothyroxine Sodium 25 MCG Oral Tablet; Therapy: 75CHZ0865 to Recorded   9  Metoprolol Tartrate 50 MG Oral Tablet; take 2 tablet twice daily; Therapy: (Recorded:02Mar2017) to  Requested for: 20VGQ0782 Recorded   10  Montelukast Sodium 10 MG Oral Tablet; take 1 tablet every day; Therapy: 36LKQ9803 to ([phone])  Requested for: 31Oct2016; Last    Rx:31Oct2016 Ordered   11  Nefazodone HCl - 150 MG Oral Tablet; Take 1 tablet by mouth at bedtime; Therapy: 48JOH1912 to (Clarise Rinne)  Requested for: 38KLI8547; Last    Rx:28Nov2016 Ordered   12  Tolterodine Tartrate 2 MG Oral Tablet; take 1 tablet twice a day; Therapy: 63YBA4771 to (Evaluate:88Grv7533)  Requested for: 77NLF1226; Last    Rx:04Jan2017 Ordered    Allergies    1  No Known Drug Allergies    2  No Known Environmental Allergies   3  No Known Food Allergies    Health Management  Afib   EKG/ECG- POC; every 1 year; Last 21RDA5740; Next Due: 60HBT3463; Active  Colonoscopy (Fiberoptic) Screening   (every) FECAL GLOBIN BY IMMUNOCHEM  (MEDICARE); every 1 year; Last 33FDA8734; Next Due:  34PIJ6420; Overdue  History of Encounter for screening mammogram for malignant neoplasm of breast   * MAMMO SCREENING BILATERAL W CAD; every 1 year; Last 72APC8835; Next Due: 64JEQ5513;   Active  Hypertension EKG/ECG- POC; every 1 year; Last 85NKM5873; Next Due: 52ZVM9055;  Active    Future Appointments    Date/Time Provider Specialty Site   06/02/2017 01:30 PM Valentin Almanzar, [address]     Signatures   Electronically signed by : Vanessa Hatfield, ; Mar 24 2017  1:48PM EST                       (Author)    Electronically signed by : Mai Arevalo; Mar 24 2017  1:59PM EST                       (Author)    Electronically signed by : Chloe Badillo DO; Mar 29 2017  5:26PM EST

## 2018-01-15 NOTE — PROGRESS NOTES
Surgery Scheduling Form  Pre-Operative Risk Assessment:   Date Last Seen: 1/22/16   Date of Surgery: TBD   Type of Surgery: Scalp lesion resection +/_ right neck mass biopsy/excision   Surgeon Name: Dr Sherren Corrigan   Pulmonary: No Pulmonary Contraindication for planned surgical procedure   Cardiac: No Cardiac Contraindication for planned surgical procedure   Vascular: No Vascular Contraindication for planned surgical procedure   Further Testing Required: No   Anticoagulation: No   Anesthesia: Choice   Patient Approved for Surgery: Yes   Clearing Doctor: Mildred Soler      Signatures   Electronically signed by : Mildred Soler MD; Jan 22 2016 12:17PM EST                       (Author)

## 2018-01-15 NOTE — PROGRESS NOTES
History of Present Illness  Care Coordination Encounter Information:   Type of Encounter: Telephonic    Spoke to Adult Child   Analia Edge  Care Coordination  Nurse [facility]: I reached out to Analia Edge today in reference to her mother Chaim Evans and she agreed to talk to me  She states that her mother is feeling "good" and denies any "chest pain or SOB"  She reports they watch her sodium intake and she weighed 96 lbs today  VN has been discontinued due to her improved health status  The family has my contact information and will reach out if they have needs  JG      Active Problems    1  Abnormal PFT (794 2) (R94 2)   2  Afib (427 31) (I48 91)   3  Allergic rhinitis (477 9) (J30 9)   4  Anxiety (300 00) (F41 9)   5  Asthma (493 90) (J45 909)   6  Atrial fibrillation with RVR (427 31) (I48 91)   7  Cardiomyopathy (425 4) (I42 9)   8  CHF (congestive heart failure) (428 0) (I50 9)   9  Colonoscopy (Fiberoptic) Screening   10  Hypercholesteremia (272 0) (E78 00)   11  Hypertension (401 9) (I10)   12  Hypokalemia (276 8) (E87 6)   13  Hypoxia (799 02) (R09 02)   14  LFTs abnormal (790 6) (R79 89)   15  Long term use of drug (V58 69) (Z79 899)   16  Melanoma of skin (172 9) (C43 9)   17  Need for prophylactic vaccination and inoculation against influenza (V04 81) (Z23)   18  Pneumonia (486) (J18 9)   19  Preoperative cardiovascular examination (V72 81) (Z01 810)   20  Preoperative clearance (V72 84) (Z01 818)   21  Primary hypothyroidism (244 9) (E03 9)   22  Prolapse of bladder   23  PVC (premature ventricular contraction) (427 69) (I49 3)   24  Screening for genitourinary condition (V81 6) (Z13 89)   25  Screening for osteoporosis (V82 81) (Z13 820)   26  SOB (shortness of breath) (786 05) (R06 02)   27  Tachycardia (785 0) (R00 0)    Past Medical History    1  History of Blood pressure elevated (401 9) (I10)   2  History of allergic rhinitis (V12 69) (Z87 09)   3  History of diarrhea (V12 79) (Z87 608)   4   History of low back pain (V13 59) (Z87 39)   5  History of pneumonia (V12 61) (Z87 01)   6  History of urinary incontinence (V13 09) (Z87 898)   7  History of Impacted cerumen, unspecified laterality (380 4) (H61 20)   8  Need for prophylactic vaccination and inoculation against influenza (V04 81) (Z23)    Surgical History    1  History of Cholecystectomy   2  History of Hysterectomy   3  History of Neck Surgery   4  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    Family History  Mother    1  Family history of   Father    2  Family history of    3  Family history of lung cancer (V16 1) (Z80 1)    Social History    · Never A Smoker   · Never Drank Alcohol   · Never Used Drugs    Current Meds    1  Eliquis 2 5 MG Oral Tablet; Take 1 tablet twice daily  Requested for: 23PQM9366; Last   Rx:19Wzy2067 Ordered    2  Furosemide 20 MG Oral Tablet; take one tablet by mouth every day  Requested for:   2017; Last Rx:54Qok5187 Ordered    3  Flonase Allergy Relief 50 MCG/ACT Nasal Suspension (Fluticasone Propionate); USE 1   SPRAY IN EACH NOSTRIL TWICE A DAY; Therapy: (Recorded:85Dwa6425) to Recorded    4  Advair Diskus 250-50 MCG/DOSE Inhalation Aerosol Powder Breath Activated; INHALE 1   PUFF EVERY 12 HOURS; Therapy: (Recorded:09Lok0581) to Recorded   5  Advair Diskus 250-50 MCG/DOSE Inhalation Aerosol Powder Breath Activated; TAKE 1   PUFF EVERY 12 HOURS; Therapy: 89RSK3348 to (Evaluate:86Ilg8750)  Requested for: 43HIP1498; Last   Rx:83Fon4231 Ordered   6  Albuterol Sulfate (2 5 MG/3ML) 0 083% Inhalation Nebulization Solution; USE 1 UNIT   DOSE EVERY 4-6 HOURS AS NEEDED FOR WHEEZING ; Therapy: 29QWW7795 to (Evaluate:2017)  Requested for: 35BSV6501; Last   Rx:50Shm5743 Ordered   7  Montelukast Sodium 10 MG Oral Tablet (Singulair); take 1 tablet every day; Therapy: 69MKG5787 to (Aziza Valadez)  Requested for: 2016; Last   Rx:2016 Ordered    8   Metoprolol Succinate ER 50 MG Oral Tablet Extended Release 24 Hour; TAKE 1 TABLET   BY MOUTH TWICE A DAY FOR 30 DAYS; Therapy: 70NKZ1465 to (Prakash Casas)  Requested for: 19ZSY7117; Last   Rx:06Apr2017 Ordered    9  Digoxin 125 MCG Oral Tablet; take 1 tablet by mouth every day; Therapy: 05CQG5747 to (Tessnyasia Casas)  Requested for: 73Juq7206; Last   Rx:04Apr2017 Ordered    10  Nefazodone HCl - 150 MG Oral Tablet; Take 1 tablet by mouth at bedtime; Therapy: 45MJN2602 to (Mulu Friedman)  Requested for: 22SHJ1039; Last    Rx:28Nov2016 Ordered    11  Tolterodine Tartrate 2 MG Oral Tablet (Detrol); take 1 tablet twice a day; Therapy: 80IWW6335 to (Kojo Quijano)  Requested for: 45BQO1478; Last    Rx:04Jan2017 Ordered    12  Klor-Con M20 20 MEQ Oral Tablet Extended Release; take 1 tablet by mouth every day; Therapy: 47XYK8091 to ()  Requested for: 03TSH2593; Last    Rx:02Feb2017 Ordered    13  Albuterol 90 MCG/ACT AERS; INHALE 1 TO 2 PUFFS EVERY 4 TO 6 HOURS AS    NEEDED; Therapy: (Recorded:19Jan2017) to Recorded    14  FentaNYL 50 MCG/HR Transdermal Patch 72 Hour; apply  patch every 72 hours; Therapy: 98APT9434 to (Last Rx:20Mar2017) Ordered    Allergies    1  No Known Drug Allergies    2  No Known Environmental Allergies   3  No Known Food Allergies    Health Management   EKG/ECG- POC; every 1 year; Last 68EPN3126; Next Due: 58LMM9836; Active   (every) FECAL GLOBIN BY IMMUNOCHEM  (MEDICARE); every 1 year; Last 34KHW7278; Next Due:  62WDS7007; Overdue   * MAMMO SCREENING BILATERAL W CAD; every 1 year; Last 76OWC9160; Next Due: 04YIK7659; Active   EKG/ECG- POC; every 1 year; Last 88GPD0984; Next Due: 95FRN7286; Active    End of Encounter Meds    1  Eliquis 2 5 MG Oral Tablet; Take 1 tablet twice daily  Requested for: 11LQU7506; Last   Rx:15Foa2694 Ordered    2  Furosemide 20 MG Oral Tablet; take one tablet by mouth every day  Requested for:   20Apr2017; Last Rx:20Apr2017 Ordered    3   Flonase Allergy Relief 50 MCG/ACT Nasal Suspension (Fluticasone Propionate); USE 1   SPRAY IN EACH NOSTRIL TWICE A DAY; Therapy: (Recorded:06Apr2017) to Recorded    4  Advair Diskus 250-50 MCG/DOSE Inhalation Aerosol Powder Breath Activated; INHALE 1   PUFF EVERY 12 HOURS; Therapy: (Recorded:06Apr2017) to Recorded   5  Advair Diskus 250-50 MCG/DOSE Inhalation Aerosol Powder Breath Activated; TAKE 1   PUFF EVERY 12 HOURS; Therapy: 40CCI9568 to (Evaluate:82Ucc1900)  Requested for: 44FJW3259; Last   Rx:37Zqq6825 Ordered   6  Albuterol Sulfate (2 5 MG/3ML) 0 083% Inhalation Nebulization Solution; USE 1 UNIT   DOSE EVERY 4-6 HOURS AS NEEDED FOR WHEEZING ; Therapy: 26WES2335 to (Evaluate:30Mar2017)  Requested for: 23XWE8610; Last   Rx:10Dfe3090 Ordered   7  Montelukast Sodium 10 MG Oral Tablet (Singulair); take 1 tablet every day; Therapy: 85DFR5515 to (42 87 135)  Requested for: 31Oct2016; Last   Rx:31Oct2016 Ordered    8  Metoprolol Succinate ER 50 MG Oral Tablet Extended Release 24 Hour; TAKE 1 TABLET   BY MOUTH TWICE A DAY FOR 30 DAYS; Therapy: 98MCK6547 to (Jackson Saris)  Requested for: 68AVD6137; Last   Rx:06Apr2017 Ordered    9  Digoxin 125 MCG Oral Tablet; take 1 tablet by mouth every day; Therapy: 04CFN9153 to (Jackson Saris)  Requested for: 04Apr2017; Last   Rx:04Apr2017 Ordered    10  Nefazodone HCl - 150 MG Oral Tablet; Take 1 tablet by mouth at bedtime; Therapy: 03MUM3283 to (Ghassan Nguyen)  Requested for: 14MAW8555; Last    Rx:28Nov2016 Ordered    11  Tolterodine Tartrate 2 MG Oral Tablet (Detrol); take 1 tablet twice a day; Therapy: 32GBX4447 to (Pee Cristina)  Requested for: 38KYO9699; Last    Rx:04Jan2017 Ordered    12  Klor-Con M20 20 MEQ Oral Tablet Extended Release; take 1 tablet by mouth every day; Therapy: 85LZF1651 to [phone])  Requested for: 54RBD2040; Last    Rx:48Orf3063 Ordered    13   Albuterol 90 MCG/ACT AERS; INHALE 1 TO 2 PUFFS EVERY 4 TO 6 HOURS AS    NEEDED; Therapy: (Recorded:19Jan2017) to Recorded    14  FentaNYL 50 MCG/HR Transdermal Patch 72 Hour; apply  patch every 72 hours;     Therapy: 33LJV7773 to (Last Rx:20Mar2017) Ordered    Future Appointments    Date/Time Provider Specialty Site   06/02/2017 01:30 PM Trudy Whelan, [address] [place] [address]     Patient Care Team    Care Team Member Role Specialty Office Number   Trudyanjelica Whelan [address] [facility] [phone]   Jackie Aldridge MD  Cardiology [phone]   Mulugeta Varela   [phone]   Slime Medrano   [phone]   Alverna Parent DO Specialist Pulmonary Medicine [phone]     Signatures   Electronically signed by : Naima Cantrell RN; Apr 21 2017  3:32PM EST                       (Author)

## 2018-01-16 NOTE — MISCELLANEOUS
Message   Recorded as Task   Date: 02/16/2017 09:34 AM, Created By: Natividad He   Task Name: Follow Up   Assigned To: Estrella Junior   Regarding Patient: Leobardo Veras, Status: Active   CommentBecca Matthews - 16 Feb 2017 9:34 AM     TASK CREATED  Caller: Caty Adkins, Other Adult; Referral Status  Per the granddaughter the patient is SOB getting up & walking around  She said Gracia Connell gave her Prednisone in the past for this & she is asking for it again  The patient also had a CXR done yesterday, the results are not in yet, I will look for them      Refill Prednisone  [facility]    pt # [phone]   Mariano Triplett - 16 Feb 2017 3:34 PM     TASK REPLIED TO: Previously Assigned To Estrella Junior  Please ask Caty Adkins her granddaughter the following:    #1 is patient taking Advair and if she has how much is    #2 this patient using mini nebulizer, and if she is what is she putting into it and how many times that she using it  Estrella Rizo - 16 Feb 2017 3:59 PM     TASK REPLIED TO: Previously Assigned To Natividad He  Patient is using the Advair 2x daily  According to Tiffanie Santa Cruz told the patient not to use the mini neb more then 1x daily due to her AFIB   Mariano Triplett - 16 Feb 2017 5:30 PM     TASK REASSIGNED: Previously Assigned To Estrella Junior  Have her continue the Advair 2 times a day as she has    Asked her to increase the mini neb to twice a day once in the morning and once with her evening meal     Let patient's family know she will need another chest x-ray in about 3 weeks  Order has been put in   [facility] - 17 Feb 2017 8:21 AM     TASK REPLIED TO: Previously Assigned To Superfeedr with patient's daughter Myles Raymond  She is aware of the plan          Signatures   Electronically signed by : Meg Faye DO; Feb 17 2017  6:14PM EST                       (Author)

## 2018-01-16 NOTE — MISCELLANEOUS
Assessment    1  Pneumonia (486) (J18 9)    Plan  Asthma    · Albuterol Sulfate (2 5 MG/3ML) 0 083% Inhalation Nebulization Solution; USE 1  UNIT DOSE EVERY 4-6 HOURS AS NEEDED FOR WHEEZING    Rx By: Fei Reynoso; Dispense: 14 Days ; #:1 X 3 ML Plas Cont (25 Plas Conts); Refill: 3; For: Asthma; BRADEN = N; Verified Transmission to [facility] #0327 Last Updated By: System, Metail; 8/15/2017 12:41:07 PM  Pneumonia    · LevoFLOXacin 500 MG Oral Tablet (Levaquin); TAKE 1 TABLET DAILY AS  DIRECTED   Rx By: Fei Reynoso; Dispense: 10 Days ; #:10 Tablet; Refill: 0; For: Pneumonia; BRADEN = N; [id number] Last Updated By: System Metail; 8/15/2017 12:38:56 PM      A/P    !> pneumonia: please stop the cephalexin and start the levaquin ( this will only be once a day)  continue the breathing treatments 3 times a day and increase fluids  come back in friday to [place] this  ( 8/18) Please call if lafleur are not improving or if you are worsening before this  rto 3 days     Chief Complaint  Chief Complaint Free Text Note Form: Patient presents to the office today for NELSY RICH after hospital stay in [facility]  Colon is def  Mammo is due 01/19/2018  Pap test is def  EKG is due 03/24/2018  Last PFT was done 03/03/2017  DEXA scan was last done 07/22/2010  AWV is due 08/26/2015  History of Present Illness  TCM Communication [facility]: The patient is being contacted for follow-up after hospitalization  Hospital records were reviewed  She was hospitalized at [address]  The date of admission: 08/09/2017, date of discharge: 08/11/2017  Diagnosis: Severe Sepsis  She was discharged to home  Medications reviewed and updated today  She scheduled a follow up appointment  Follow-up appointments with other specialists: None Known at the time of Contact  The patient is currently asymptomatic  Communication performed and completed by TILA Peralta MA   HPI: Here today for hosptial [facility]  was seen here on 8/8 with complaint of chest congestion and cough   had a cxr that was done earlier in the day that was read as normal  was feeling worse as the next day progressed so family took her to er [facility]) repeat cxr should developing pneumonia  was admitted and was found to be septic  was discharged on keflex on 8/11 with a cxr that am was not read until this am and shows worsening pneumonia  pt states she feels " ok" still with cough and some SOB  Isaak McHenry no fevers  is drinking a lot of fluids  Review of Systems  Complete-Female:   Constitutional: No fever, no chills, feels well, no tiredness, no recent weight gain or weight loss  ENT: no complaints of earache, no loss of hearing, no nose bleeds, no nasal discharge, no sore throat, no hoarseness  Cardiovascular: No complaints of slow heart rate, no fast heart rate, no chest pain, no palpitations, no leg claudication, no lower extremity edema  Respiratory: as noted in HPI  Gastrointestinal: No complaints of abdominal pain, no constipation, no nausea or vomiting, no diarrhea, no bloody stools  Genitourinary: No complaints of dysuria, no incontinence, no pelvic pain, no dysmenorrhea, no vaginal discharge or bleeding  Musculoskeletal: No complaints of arthralgias, no myalgias, no joint swelling or stiffness, no limb pain or swelling  Integumentary: No complaints of skin rash or lesions, no itching, no skin wounds, no breast pain or lump  Neurological: No complaints of headache, no confusion, no convulsions, no numbness, no dizziness or fainting, no tingling, no limb weakness, no difficulty walking  Active Problems    1  Afib (427 31) (I48 91)   2  Allergic rhinitis (477 9) (J30 9)   3  Anxiety (300 00) (F41 9)   4  Asthma (493 90) (J45 909)   5  Atrial fibrillation with RVR (427 31) (I48 91)   6  Cardiomyopathy (425 4) (I42 9)   7  CHF (congestive heart failure) (428 0) (I50 9)   8  Cough (786 2) (R05)   9   Hypercholesteremia (272 0) (E78 00)   10  Hypertension (401 9) (I10)   11  Hypokalemia (276 8) (E87 6)   12  Melanoma of skin (172 9) (C43 9)   13  Primary hypothyroidism (244 9) (E03 9)   14  Prolapse of bladder   15  PVC (premature ventricular contraction) (427 69) (I49 3)    Past Medical History    1  History of Blood pressure elevated (401 9) (I10)   2  History of allergic rhinitis (V12 69) (Z87 09)   3  History of diarrhea (V12 79) (Z87 898)   4  History of low back pain (V13 59) (Z87 39)   5  History of pneumonia (V12 61) (Z87 01)   6  History of urinary incontinence (V13 09) (Z87 898)   7  History of Impacted cerumen, unspecified laterality (380 4) (H61 20)    Surgical History    1  History of Cholecystectomy   2  History of Hysterectomy   3  History of Neck Surgery   4  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    Family History  Mother    1  Family history of    2  Denied: Family history of drug abuse   3  Denied: Family history of Mental health problem  Father    4  Family history of    5  Denied: Family history of drug abuse   6  Family history of lung cancer (V16 1) (Z80 1)   7  Denied: Family history of Mental health problem  Multiple Family Members    8  Denied: Family history of Drug abuse   9  Denied: Family history of Mental health problem  Family History Reviewed: The family history was reviewed and updated today  Social History    · Never A Smoker   · Never Drank Alcohol   · Never Used Drugs  Social History Reviewed: The social history was reviewed and updated today  The social history was reviewed and is unchanged  Current Meds   1  Advair Diskus 250-50 MCG/DOSE Inhalation Aerosol Powder Breath Activated; INHALE 1   PUFF EVERY 12 HOURS; Therapy: (Recorded:2017) to Recorded   2  Albuterol 90 MCG/ACT AERS; INHALE 1 TO 2 PUFFS EVERY 4 TO 6 HOURS AS   NEEDED; Therapy: (Recorded:2017) to Recorded   3   Albuterol Sulfate (2 5 MG/3ML) 0 083% Inhalation Nebulization Solution; USE 1 UNIT   DOSE EVERY 4-6 HOURS AS NEEDED FOR WHEEZING ; Therapy: 90CTR3608 to (Evaluate:30Mar2017)  Requested for: 41SFH7891; Last   Rx:17Jdv2494 Ordered   4  Cephalexin 500 MG Oral Capsule; TAKE 1 CAPSULE Every twelve hours; Therapy: (Recorded:80Mux9027) to Recorded   5  Digoxin 125 MCG Oral Tablet; take 1 tablet by mouth every day; Therapy: 87UKY5506 to (0488 90 45 88)  Requested for: 62Vjx8176; Last   Rx:04Apr2017 Ordered   6  Eliquis 2 5 MG Oral Tablet; Take 1 tablet twice daily  Requested for: 37EOT3552; Last   Rx:42Sqw6612 Ordered   7  FentaNYL 50 MCG/HR Transdermal Patch 72 Hour; apply  patch every 72 hours; Therapy: 10FSB9356 to (Last Rx:08Iii6894) Ordered   8  Flonase Allergy Relief 50 MCG/ACT Nasal Suspension; USE 1 SPRAY IN EACH   NOSTRIL TWICE A DAY; Therapy: (Recorded:87Mpk3507) to Recorded   9  Furosemide 20 MG Oral Tablet; take one tablet by mouth every day  Requested for:   28Apr2017; Last Rx:28Apr2017 Ordered   10  Klor-Con M20 20 MEQ Oral Tablet Extended Release; take 1 tablet by mouth every day; Therapy: 24STO9159 to (263 5207)  Requested for: 02NWF2656; Last    Rx:08Jnm3391 Ordered   11  Metoprolol Succinate ER 50 MG Oral Tablet Extended Release 24 Hour; TAKE 1 TABLET    BY MOUTH TWICE A DAY FOR 30 DAYS; Therapy: 11JEW6747 to (0488 90 45 88)  Requested for: 66GUJ9811; Last    Rx:06Apr2017 Ordered   12  Montelukast Sodium 10 MG Oral Tablet; take 1 tablet every day; Therapy: 27GXD1291 to (0488 90 45 88)  Requested for: 53Wuz8736; Last    Rx:00Sij9571 Ordered   13  Nefazodone HCl - 150 MG Oral Tablet; Take 1 tablet twice daily; Therapy: 49SHX5371 to (Evaluate:22Apr2018)  Requested for: 05Vls9595; Last    Rx:38Qyw2145 Ordered   14  Tolterodine Tartrate 2 MG Oral Tablet; take 1 tablet twice a day; Therapy: 17IUL9902 to (Evaluate:49Aae2121)  Requested for: 95XDV2333; Last    Rx:04Jan2017 Ordered    Allergies    1   No Known Drug Allergies 2  No Known Environmental Allergies   3  No Known Food Allergies    Vitals  Signs   Recorded: 11Eel7934 12:13PM   Weight: 94 lb 2 oz  BMI Calculated: 18 38  BSA Calculated: 1 35  Height Unobtainable: Yes  Recorded: 36Dcx4265 12:12PM   Temperature: 98 4 F  Heart Rate: 88  Respiration: 24  Systolic: 221  Diastolic: 80    Physical Exam    Constitutional   General appearance: No acute distress, well appearing and well nourished  Ears, Nose, Mouth, and Throat   Otoscopic examination: Tympanic membranes translucent with normal light reflex  Canals patent without erythema  Nasal mucosa, septum, and turbinates: Normal without edema or erythema  Oropharynx: Normal with no erythema, edema, exudate or lesions  Pulmonary   Auscultation of lungs: Abnormal   rales R lower lobe  Cardiovascular   Auscultation of heart: Normal rate and rhythm, normal S1 and S2, without murmurs  Lymphatic   Palpation of lymph nodes in neck: No lymphadenopathy  Skin   Skin and subcutaneous tissue: Normal without rashes or lesions  Psychiatric   Orientation to person, place, and time: Normal     Mood and affect: Normal          Health Management  Afib   EKG/ECG- POC; every 1 year; Last 49VNY8791; Next Due: 58UEN4477; Active  History of Colonoscopy (Fiberoptic) Screening   (every) FECAL GLOBIN BY IMMUNOCHEM  (MEDICARE); every 1 year; Last 93KGP8008; Next Due:  35ZQT9780; Overdue  History of Encounter for screening mammogram for malignant neoplasm of breast   * MAMMO SCREENING BILATERAL W CAD; every 1 year; Last 20AZZ2195; Next Due: 15NXZ4699; Active  Hypertension   EKG/ECG- POC; every 1 year; Last 64LHX2507; Next Due: 80EFX4473;  Active    Future Appointments    Date/Time Provider Specialty Site   08/18/2017 03:30 PM Arlon Lanes, [address] [facility]   09/14/2017 01:30 PM Arlon Lanes, [address]     Signatures   Electronically signed by : Urbano Quarles, ; Aug 14 2017  5:17PM EST                       (Author)    Electronically signed by : EDITH Santa;  Aug 15 2017  1:42PM EST                       (Author)    Electronically signed by : KOFI Mixon ; Aug 15 2017  1:43PM EST                       (Author)

## 2018-01-22 VITALS
BODY MASS INDEX: 17.79 KG/M2 | WEIGHT: 94.13 LBS | HEART RATE: 88 BPM | DIASTOLIC BLOOD PRESSURE: 80 MMHG | SYSTOLIC BLOOD PRESSURE: 134 MMHG | TEMPERATURE: 98.4 F | RESPIRATION RATE: 24 BRPM

## 2018-02-11 NOTE — PROGRESS NOTES
History of Present Illness  Care Coordination Encounter Information:   Type of Encounter: Telephonic    Spoke to Adult Child   Sachin Rhoades- identified in chart  Care Coordination [facility] Nurse [facility]: I reached out to discuss Kayla's health and was able to converse with her daughter Sachin Rhoades  She stated that her mother "denies SOB or chest pains and is feeling pretty good"  She said "her weight really fluctuates and was 93 lbs today"  That concerned me and did ask for a review of weights for which she disclosed 94,96, and 98  She said she does this often and I said if she loses weight again tomorrow that she might want to call the office to make them aware  She said her diet consist of low sodium options and stated her breakfast to include cream of wheat, coffee and cake  VN are in the house 2x/week  I will reach out again in a few weeks  JG      Active Problems    1  Abnormal PFT (794 2) (R94 2)   2  Afib (427 31) (I48 91)   3  Allergic rhinitis (477 9) (J30 9)   4  Anxiety (300 00) (F41 9)   5  Asthma (493 90) (J45 909)   6  Atrial fibrillation with RVR (427 31) (I48 91)   7  Cardiomyopathy (425 4) (I42 9)   8  CHF (congestive heart failure) (428 0) (I50 9)   9  Colonoscopy (Fiberoptic) Screening   10  Hypercholesteremia (272 0) (E78 00)   11  Hypertension (401 9) (I10)   12  Hypokalemia (276 8) (E87 6)   13  Hypoxia (799 02) (R09 02)   14  LFTs abnormal (790 6) (R79 89)   15  Long term use of drug (V58 69) (Z79 899)   16  Melanoma of skin (172 9) (C43 9)   17  Need for prophylactic vaccination and inoculation against influenza (V04 81) (Z23)   18  Pneumonia (486) (J18 9)   19  Preoperative cardiovascular examination (V72 81) (Z01 810)   20  Preoperative clearance (V72 84) (Z01 818)   21  Primary hypothyroidism (244 9) (E03 9)   22  Prolapse of bladder   23  PVC (premature ventricular contraction) (427 69) (I49 3)   24  Screening for genitourinary condition (V81 6) (Z13 89)   25   Screening for osteoporosis (V82 81) Initial /CM Assessment/Plan of Care Note     Baseline Assessment  82 year old admitted 2/10/2018 as Inpatient.   Prior to admission patient was living with Other (Comment) ([facility]) and residing at Rehabilitation/Skilled nursing/Long-term care facility.  Patient does have a Power of  for Healthcare.  Document  IS NOT activated.  Agent is stiven Pierce. Patient’s Primary Care Provider is Gaye Iraheta MD.     Medical History  Past Medical History:   Diagnosis Date   • Anemia    • Chronic kidney disease (CKD)    • Chronic pain    • CKD (chronic kidney disease), stage IV (CMS/HCC)    • Congestive heart failure (CHF) (CMS/[facility])    • Dialysis patient (CMS/[facility])    • Enlarged heart    • Essential hypertension, benign    • GILBERT'S DISEASE OR CHOLEMIA    • Gout    • Hepatitis B antibody positive 11/22/13    S/P Vaccine Series   • Hyperkalemia    • Hyperlipidemia    • Hyperparathyroidism, secondary (CMS/[facility])    • Hypopotassemia    • Ingrowing nail     right   • Myocardial infarction    • 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, 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   • Peripheral neuropathy    • Rash    • Type II or unspecified type diabetes mellitus without mention of complication, not stated as uncontrolled     IDDM   • Unspecified hypothyroidism    • Wears glasses        Prior to Admission Status       Agency/Support  Type of Services Prior to Hospitalization: Other (comment) (at rehab)  Support Systems: Children, Family members  Home Devices/Equipment: Blood glucose monitor, Mobility assist device, Sensory assist device  Mobility Assist Devices: Front-wheeled walker  Sensory Support Devices: Eyeglasses, Dentures (upper)    Current Status  PT Ambulation Tips:    PT Transfer Tips:    OT Bathing Tips:    OT Dressing Tips:    OT Toileting Tips:    OT Feeding Tips:    SLP Swallow/Feeding Tips:    SLP Comm/Cog Tips:    Current Mental Status:     Stressors:      Insurance  Primary: MEDICARE  Secondary: ANTHEM/BCBS    Barriers to Discharge  Identified Barriers to Discharge/Transition Planning: Assessment/stabilization in progress    Progress Note      Plan  SW/CM - Recommendations for Discharge:    PT - Recommendations for Discharge:    OT - Recommendations for Discharge:    SLP - Recommendations for Discharge:    Anticipate patient will need post-hospital services. Necessary services are available.  Anticipate patient can return to the environment from which patient entered the hospital.   Anticipate patient cannot provide self-care at discharge.    Refer to SW/CM Flowsheet for Goals and objective data.     Sunday coverage.  Referral received this date per Nursing trigger for Return to Long-Term Acute Care (LTAC) facility.  Pt admitted last night through ER via ambulance with altered mental status and dialysis fistula not working per ER RN entry.  Per ER MD consult pt from [facility] (a skilled nursing facility rather than LTAC).  Pt dialyzes F[facility]qjcut-Xrgtrprlm-Dyrgdyjld per consult.  [facility] in chart appears complete and valid and has not been activated.    PT eval pending, pt scheduled for US for LUE clot per PT so pt not yet seen.  OT eval also pending US to ruleout DVT.  MD note reflects pt still confused, awake and alert, not oriented, likely secondary to infection and appears to be improving.  Nephrologist consulted and BP stable and continue antibiotics.    Pt here last month and per peer's notes, new dialysis arrangements were made to [address] ([phone]/fax [phone]) with family to provide transportation until [facility] was approved.  Schedule was T-Th-Sat at 6:45 a.m.  Peer provided family with information on Assisted Living options at that time per daughter's request. Pt was discharged 1-14 to [facility] via family for rehab.        Message to [facility] che Kim ([phone]) inquiring about bed hold status.  Will  (Z13 820)   26  SOB (shortness of breath) (786 05) (R06 02)   27  Tachycardia (785 0) (R00 0)    Past Medical History    1  History of Blood pressure elevated (401 9) (I10)   2  History of allergic rhinitis (V12 69) (Z87 09)   3  History of diarrhea (V12 79) (Z87 898)   4  History of low back pain (V13 59) (Z87 39)   5  History of pneumonia (V12 61) (Z87 01)   6  History of urinary incontinence (V13 09) (Z87 898)   7  History of Impacted cerumen, unspecified laterality (380 4) (H61 20)   8  Need for prophylactic vaccination and inoculation against influenza (V04 81) (Z23)    Surgical History    1  History of Cholecystectomy   2  History of Hysterectomy   3  History of Neck Surgery   4  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    Family History  Mother    1  Family history of   Father    2  Family history of    3  Family history of lung cancer (V16 1) (Z80 1)    Social History    · Never A Smoker   · Never Drank Alcohol   · Never Used Drugs    Current Meds    1  Eliquis 2 5 MG Oral Tablet; Take 1 tablet twice daily  Requested for: 46IXY3797; Last   Rx:02Bnv0908 Ordered    2  Furosemide 20 MG Oral Tablet; take 0 5 tablet daily  Requested for: 69MKJ6359; Last   Rx:93Yed2281 Ordered    3  Advair Diskus 250-50 MCG/DOSE Inhalation Aerosol Powder Breath Activated; TAKE 1   PUFF EVERY 12 HOURS; Therapy: 43BDG9005 to (Evaluate:50Sxc5911)  Requested for: 32WUT0718; Last   Rx:43Eoc9859 Ordered   4  Albuterol Sulfate (2 5 MG/3ML) 0 083% Inhalation Nebulization Solution; USE 1 UNIT   DOSE EVERY 4-6 HOURS AS NEEDED FOR WHEEZING ; Therapy: 50AHN2622 to (Evaluate:2017)  Requested for: 54ATN1461; Last   Rx:79Qbn3742 Ordered   5  Montelukast Sodium 10 MG Oral Tablet (Singulair); take 1 tablet every day; Therapy: 92KIR0510 to [phone]  Requested for: 2016; Last   Rx:2016 Ordered    6  Nefazodone HCl - 150 MG Oral Tablet; Take 1 tablet by mouth at bedtime;    Therapy: 45TOT6458 to (Aureliano )  Requested for: 83XOD9014; Last   Rx:28Nov2016 Ordered    7  Metoprolol Tartrate 50 MG Oral Tablet; take 2 tablet twice daily; Therapy: (Recorded:02Mar2017) to  Requested for: 52GNZ8638 Recorded   8  Tolterodine Tartrate 2 MG Oral Tablet (Detrol); take 1 tablet twice a day; Therapy: 11YZE3496 to (Evaluate:12Ptp0976)  Requested for: 89GKP2918; Last   Rx:04Jan2017 Ordered    9  Klor-Con M20 20 MEQ Oral Tablet Extended Release; take 1 tablet by mouth every day; Therapy: 88SLF5797 to ()  Requested for: 17SPZ7242; Last   Rx:02Feb2017 Ordered    10  Albuterol 90 MCG/ACT AERS; INHALE 1 TO 2 PUFFS EVERY 4 TO 6 HOURS AS    NEEDED; Therapy: (Recorded:19Jan2017) to Recorded    11  FentaNYL 50 MCG/HR Transdermal Patch 72 Hour; apply  patch every 72 hours; Therapy: 39ZSM3276 to (Last Rx:20Mar2017) Ordered    12  Digoxin 125 MCG Oral Tablet; Therapy: (Recorded:17Mar2017) to Recorded   13  LORazepam 1 MG Oral Tablet; Therapy: 94AZU3858 to Recorded    Allergies    1  No Known Drug Allergies    2  No Known Environmental Allergies   3  No Known Food Allergies    Health Management   EKG/ECG- POC; every 1 year; Last 93GLD7612; Next Due: 84SGQ8141; Active   (every) FECAL GLOBIN BY IMMUNOCHEM  (MEDICARE); every 1 year; Last 12CIJ7089; Next Due:  08UHA5510; Overdue   * MAMMO SCREENING BILATERAL W CAD; every 1 year; Last 83JUB4944; Next Due: 95TAN5622; Active   EKG/ECG- POC; every 1 year; Last 25JLT9172; Next Due: 51NKW8248; Active    End of Encounter Meds    1  Eliquis 2 5 MG Oral Tablet; Take 1 tablet twice daily  Requested for: 28GGH3448; Last   Rx:48Ksu1870 Ordered    2  Furosemide 20 MG Oral Tablet; take 0 5 tablet daily  Requested for: 77WNA7778; Last   Rx:14Feb2017 Ordered    3  Advair Diskus 250-50 MCG/DOSE Inhalation Aerosol Powder Breath Activated; TAKE 1   PUFF EVERY 12 HOURS;    Therapy: 90FUO3411 to (Evaluate:05Yxc3991)  Requested for: 64MMT3092; Last see pt and contact family as appropriate and assess and assist with discharge disposition.     Rx: 42CFS2059 Ordered   4  Albuterol Sulfate (2 5 MG/3ML) 0 083% Inhalation Nebulization Solution; USE 1 UNIT   DOSE EVERY 4-6 HOURS AS NEEDED FOR WHEEZING ; Therapy: 06QGH8392 to (Evaluate:30Mar2017)  Requested for: 38TIQ8395; Last   Rx:02Feb2017 Ordered   5  Montelukast Sodium 10 MG Oral Tablet (Singulair); take 1 tablet every day; Therapy: 22ZPS4783 to ([phone])  Requested for: 31Oct2016; Last   Rx:31Oct2016 Ordered    6  Nefazodone HCl - 150 MG Oral Tablet; Take 1 tablet by mouth at bedtime; Therapy: 82VJI4759 to ([employer])  Requested for: 99VOQ7896; Last   Rx:28Nov2016 Ordered    7  Metoprolol Tartrate 50 MG Oral Tablet; take 2 tablet twice daily; Therapy: (Recorded:02Mar2017) to  Requested for: 59FJU7401 Recorded   8  Tolterodine Tartrate 2 MG Oral Tablet (Detrol); take 1 tablet twice a day; Therapy: 55CYL4401 to (Evaluate:30Dec2017)  Requested for: 76AUH4505; Last   Rx:04Jan2017 Ordered    9  Klor-Con M20 20 MEQ Oral Tablet Extended Release; take 1 tablet by mouth every day; Therapy: 40WLN0057 to [phone])  Requested for: 57XOU8229; Last   Rx:02Feb2017 Ordered    10  Albuterol 90 MCG/ACT AERS; INHALE 1 TO 2 PUFFS EVERY 4 TO 6 HOURS AS    NEEDED; Therapy: (Recorded:19Jan2017) to Recorded    11  FentaNYL 50 MCG/HR Transdermal Patch 72 Hour; apply  patch every 72 hours; Therapy: 85FPF0036 to (Last Rx:20Mar2017) Ordered    12  Digoxin 125 MCG Oral Tablet; Therapy: (Recorded:17Mar2017) to Recorded   13  LORazepam 1 MG Oral Tablet;     Therapy: 51RBA7592 to Recorded    Future Appointments    Date/Time Provider Specialty Site   06/02/2017 01:30 PM Reese Remy, [address] [place] [address]     Patient Care Team    Care Team Member Role Specialty Office Number   Reese Deshpande [facility] Medicine [phone]   Gini Cabot MD  Cardiology [phone]   Jesus Escobedo   [phone]   Tammi Mcintyre   [phone]   Jennifer Frances DO Specialist Pulmonary Medicine [phone]     Signatures   Electronically signed by : Alisa Brower RN; Mar 31 2017 11:39AM EST                       (Author)